# Patient Record
Sex: FEMALE | Race: WHITE | NOT HISPANIC OR LATINO | Employment: FULL TIME | URBAN - METROPOLITAN AREA
[De-identification: names, ages, dates, MRNs, and addresses within clinical notes are randomized per-mention and may not be internally consistent; named-entity substitution may affect disease eponyms.]

---

## 2022-08-03 PROBLEM — R00.2 PALPITATIONS: Status: ACTIVE | Noted: 2022-08-03

## 2022-08-03 NOTE — PROGRESS NOTES
Date of Office Visit: 2022  Encounter Provider: Dr. Narayan Siddiqui  Place of Service: [facility] CARDIOLOGY [place]  Patient Name: Bell Rodriguez  :1980  Provider, No Known    Chief Complaint   Patient presents with   • Palpitations   • Consult     History of Present Illness:    I am pleased to see Mrs. Rodriguez in my office today as a new consultation.    As you know, patient is 42 years old white female whose past medical history is insignificant for any medical illness who is referred to me for symptom of palpitation.    Patient reports that she has history of significant obesity few years back.  Patient underwent gastric sleeve operation.  She used to weigh 260 at that time.  After that patient has lost more than 100 pounds.  In 2022, patient had COVID-19 infection.  Since then she reports that she is having symptom of palpitation.  She described this as a skipping of the heartbeat.  Patient described no racing of the heart.  Patient complain of dizziness and lightheadedness with these episodes.  Patient also complain of fogginess.  Patient denies any chest pain.  No mone syncope.  No shortness of breath.    Patient does not have previous history of CAD, PCI or MI.  No previous history of asthma or COPD.  She does not smoke or abuse alcohol.    EKG showed normal sinus rhythm.  Poor progression of R wave noted.    At this stage, I would recommend to proceed with event monitor.  I would proceed with echocardiogram and stress test.        Past Medical History:   Diagnosis Date   • Diabetes mellitus (HCC)    • Palpitations          Past Surgical History:   Procedure Laterality Date   • CARDIAC CATHETERIZATION     •  SECTION     • GASTRIC SLEEVE LAPAROSCOPIC     • HYSTERECTOMY           No current outpatient medications on file.      Social History     Socioeconomic History   • Marital status:    Tobacco Use   • Smoking status: Former Smoker     Quit date: 2012     Years since  "quitting: 10.5   • Smokeless tobacco: Never Used   Vaping Use   • Vaping Use: Some days   • Substances: Nicotine   • Passive vaping exposure: Yes   Substance and Sexual Activity   • Alcohol use: Yes     Comment: rarely   • Drug use: Yes     Types: Marijuana   • Sexual activity: Defer         Review of Systems   Constitutional: Negative for chills and fever.   HENT: Negative for ear discharge and nosebleeds.    Eyes: Negative for discharge and redness.   Cardiovascular: Positive for palpitations. Negative for chest pain, orthopnea, paroxysmal nocturnal dyspnea and syncope.   Respiratory: Positive for shortness of breath. Negative for cough and wheezing.    Endocrine: Negative for heat intolerance.   Skin: Negative for rash.   Musculoskeletal: Negative for arthritis and myalgias.   Gastrointestinal: Negative for abdominal pain, melena, nausea and vomiting.   Genitourinary: Negative for dysuria and hematuria.   Neurological: Positive for dizziness. Negative for light-headedness, numbness and tremors.   Psychiatric/Behavioral: Negative for depression. The patient is not nervous/anxious.        Procedures      ECG 12 Lead    Date/Time: 8/4/2022 6:07 PM  Performed by: Narayan Siddiqui MD  Authorized by: Narayan Siddiqui MD   Comparison: compared with previous ECG   Similar to previous ECG  Rhythm: sinus rhythm    Clinical impression: normal ECG            ECG 12 Lead    (Results Pending)           Objective:    /78 (BP Location: Left arm, Patient Position: Sitting, Cuff Size: Adult)   Pulse 61   Ht 162.6 cm (64\")   Wt 63 kg (139 lb)   SpO2 99%   BMI 23.86 kg/m²         Constitutional:       Appearance: Well-developed.   Eyes:      General: No scleral icterus.        Right eye: No discharge.   HENT:      Head: Normocephalic and atraumatic.   Neck:      Thyroid: No thyromegaly.      Lymphadenopathy: No cervical adenopathy.   Pulmonary:      Effort: Pulmonary effort is normal. No respiratory distress.      Breath " sounds: Normal breath sounds. No wheezing. No rales.   Cardiovascular:      Normal rate. Regular rhythm.      No gallop.   Abdominal:      Tenderness: There is no abdominal tenderness.   Skin:     Findings: No erythema or rash.   Neurological:      Mental Status: Alert and oriented to person, place, and time.             Assessment:       Diagnosis Plan   1. Palpitations  ECG 12 Lead    Cardiac Event Monitor    Stress Test With Myocardial Perfusion One Day    Adult Transthoracic Echo Complete W/ Cont if Necessary Per Protocol   2. Dizziness  ECG 12 Lead    Cardiac Event Monitor    Stress Test With Myocardial Perfusion One Day    Adult Transthoracic Echo Complete W/ Cont if Necessary Per Protocol            Plan:       MDM:    1.  Palpitation:    I would proceed with event monitor.  Echocardiogram would be done.  Stress test should be done    2.  Dizziness:    I will proceed with echocardiogram

## 2022-08-04 ENCOUNTER — OFFICE VISIT (OUTPATIENT)
Dept: CARDIOLOGY | Facility: CLINIC | Age: 42
End: 2022-08-04

## 2022-08-04 VITALS
SYSTOLIC BLOOD PRESSURE: 130 MMHG | HEART RATE: 61 BPM | HEIGHT: 64 IN | WEIGHT: 139 LBS | OXYGEN SATURATION: 99 % | BODY MASS INDEX: 23.73 KG/M2 | DIASTOLIC BLOOD PRESSURE: 78 MMHG

## 2022-08-04 DIAGNOSIS — R00.2 PALPITATIONS: Primary | ICD-10-CM

## 2022-08-04 DIAGNOSIS — R42 DIZZINESS: ICD-10-CM

## 2022-08-04 PROBLEM — E55.9 VITAMIN D DEFICIENCY: Status: ACTIVE | Noted: 2022-08-04

## 2022-08-04 PROBLEM — G47.00 INSOMNIA: Status: ACTIVE | Noted: 2022-08-04

## 2022-08-04 PROCEDURE — 99204 OFFICE O/P NEW MOD 45 MIN: CPT | Performed by: INTERNAL MEDICINE

## 2022-08-04 PROCEDURE — 93000 ELECTROCARDIOGRAM COMPLETE: CPT | Performed by: INTERNAL MEDICINE

## 2022-08-04 RX ORDER — CHOLECALCIFEROL (VITAMIN D3) 125 MCG
CAPSULE ORAL DAILY
COMMUNITY
End: 2022-08-04

## 2022-08-04 RX ORDER — DIPHENOXYLATE HYDROCHLORIDE AND ATROPINE SULFATE 2.5; .025 MG/1; MG/1
TABLET ORAL DAILY
COMMUNITY
End: 2022-08-04

## 2022-08-04 RX ORDER — DOXYCYCLINE HYCLATE 50 MG/1
324 CAPSULE, GELATIN COATED ORAL DAILY
COMMUNITY
End: 2022-08-04

## 2022-09-02 ENCOUNTER — HOSPITAL ENCOUNTER (OUTPATIENT)
Dept: NUCLEAR MEDICINE | Facility: HOSPITAL | Age: 42
Discharge: HOME | End: 2022-09-02

## 2022-09-02 ENCOUNTER — HOSPITAL ENCOUNTER (OUTPATIENT)
Dept: CARDIOLOGY | Facility: HOSPITAL | Age: 42
Discharge: HOME | End: 2022-09-02

## 2022-09-02 ENCOUNTER — HOSPITAL ENCOUNTER (OUTPATIENT)
Dept: RESPIRATORY THERAPY | Facility: HOSPITAL | Age: 42
Discharge: HOME | End: 2022-09-02

## 2022-09-02 DIAGNOSIS — R42 DIZZINESS: ICD-10-CM

## 2022-09-02 DIAGNOSIS — R00.2 PALPITATIONS: ICD-10-CM

## 2022-09-02 PROCEDURE — 93017 CV STRESS TEST TRACING ONLY: CPT

## 2022-09-02 PROCEDURE — 0 TECHNETIUM TETROFOSMIN KIT: Performed by: INTERNAL MEDICINE

## 2022-09-02 PROCEDURE — 78452 HT MUSCLE IMAGE SPECT MULT: CPT | Performed by: INTERNAL MEDICINE

## 2022-09-02 PROCEDURE — 93306 TTE W/DOPPLER COMPLETE: CPT | Performed by: INTERNAL MEDICINE

## 2022-09-02 PROCEDURE — 93018 CV STRESS TEST I&R ONLY: CPT | Performed by: INTERNAL MEDICINE

## 2022-09-02 PROCEDURE — 93306 TTE W/DOPPLER COMPLETE: CPT

## 2022-09-02 PROCEDURE — A9502 TC99M TETROFOSMIN: HCPCS | Performed by: INTERNAL MEDICINE

## 2022-09-02 PROCEDURE — 93270 REMOTE 30 DAY ECG REV/REPORT: CPT

## 2022-09-02 PROCEDURE — 78452 HT MUSCLE IMAGE SPECT MULT: CPT

## 2022-09-02 PROCEDURE — 93016 CV STRESS TEST SUPVJ ONLY: CPT | Performed by: INTERNAL MEDICINE

## 2022-09-02 RX ADMIN — TETROFOSMIN 1 DOSE: 1.38 INJECTION, POWDER, LYOPHILIZED, FOR SOLUTION INTRAVENOUS at 11:57

## 2022-09-02 RX ADMIN — TETROFOSMIN 1 DOSE: 1.38 INJECTION, POWDER, LYOPHILIZED, FOR SOLUTION INTRAVENOUS at 10:15

## 2022-09-05 LAB
BH CV NUCLEAR PRIOR STUDY: 3
BH CV REST NUCLEAR ISOTOPE DOSE: 11 MCI
BH CV STRESS BP STAGE 1: NORMAL
BH CV STRESS BP STAGE 2: NORMAL
BH CV STRESS BP STAGE 3: NORMAL
BH CV STRESS DURATION MIN STAGE 1: 3
BH CV STRESS DURATION MIN STAGE 2: 3
BH CV STRESS DURATION MIN STAGE 3: 3
BH CV STRESS DURATION MIN STAGE 4: 3
BH CV STRESS DURATION SEC STAGE 1: 0
BH CV STRESS DURATION SEC STAGE 2: 0
BH CV STRESS DURATION SEC STAGE 3: 0
BH CV STRESS DURATION SEC STAGE 4: 0
BH CV STRESS GRADE STAGE 1: 10
BH CV STRESS GRADE STAGE 2: 12
BH CV STRESS GRADE STAGE 3: 14
BH CV STRESS GRADE STAGE 4: 16
BH CV STRESS HR STAGE 1: 86
BH CV STRESS HR STAGE 2: 93
BH CV STRESS HR STAGE 3: 111
BH CV STRESS HR STAGE 4: 152
BH CV STRESS METS STAGE 1: 5
BH CV STRESS METS STAGE 2: 7.5
BH CV STRESS METS STAGE 3: 10
BH CV STRESS METS STAGE 4: 13.5
BH CV STRESS NUCLEAR ISOTOPE DOSE: 32 MCI
BH CV STRESS PROTOCOL 1: NORMAL
BH CV STRESS RECOVERY BP: NORMAL MMHG
BH CV STRESS RECOVERY HR: 71 BPM
BH CV STRESS SPEED STAGE 1: 1.7
BH CV STRESS SPEED STAGE 2: 2.5
BH CV STRESS SPEED STAGE 3: 3.4
BH CV STRESS SPEED STAGE 4: 4.2
BH CV STRESS STAGE 1: 1
BH CV STRESS STAGE 2: 2
BH CV STRESS STAGE 3: 3
BH CV STRESS STAGE 4: 4
LV EF NUC BP: 72 %
MAXIMAL PREDICTED HEART RATE: 178 BPM
PERCENT MAX PREDICTED HR: 85.39 %
STRESS BASELINE BP: NORMAL MMHG
STRESS BASELINE HR: 56 BPM
STRESS PERCENT HR: 100 %
STRESS POST ESTIMATED WORKLOAD: 12.8 METS
STRESS POST EXERCISE DUR MIN: 12 MIN
STRESS POST EXERCISE DUR SEC: 21 SEC
STRESS POST PEAK BP: NORMAL MMHG
STRESS POST PEAK HR: 152 BPM
STRESS TARGET HR: 151 BPM

## 2022-09-06 ENCOUNTER — TELEPHONE (OUTPATIENT)
Dept: CARDIOLOGY | Facility: CLINIC | Age: 42
End: 2022-09-06

## 2022-09-06 LAB
BH CV ECHO MEAS - ACS: 1.79 CM
BH CV ECHO MEAS - AO MAX PG: 8.2 MMHG
BH CV ECHO MEAS - AO MEAN PG: 3.6 MMHG
BH CV ECHO MEAS - AO ROOT DIAM: 2.9 CM
BH CV ECHO MEAS - AO V2 MAX: 143.3 CM/SEC
BH CV ECHO MEAS - AO V2 VTI: 28 CM
BH CV ECHO MEAS - AVA(I,D): 1.88 CM2
BH CV ECHO MEAS - EDV(CUBED): 121.4 ML
BH CV ECHO MEAS - EDV(MOD-SP4): 75.8 ML
BH CV ECHO MEAS - EF(MOD-BP): 60 %
BH CV ECHO MEAS - EF(MOD-SP4): 59.8 %
BH CV ECHO MEAS - ESV(CUBED): 39.8 ML
BH CV ECHO MEAS - ESV(MOD-SP4): 30.4 ML
BH CV ECHO MEAS - FS: 31 %
BH CV ECHO MEAS - IVS/LVPW: 0.67 CM
BH CV ECHO MEAS - IVSD: 0.48 CM
BH CV ECHO MEAS - LA A2CS (ATRIAL LENGTH): 3 CM
BH CV ECHO MEAS - LV DIASTOLIC VOL/BSA (35-75): 45.2 CM2
BH CV ECHO MEAS - LV MASS(C)D: 92.8 GRAMS
BH CV ECHO MEAS - LV MAX PG: 5.2 MMHG
BH CV ECHO MEAS - LV MEAN PG: 2.6 MMHG
BH CV ECHO MEAS - LV SYSTOLIC VOL/BSA (12-30): 18.2 CM2
BH CV ECHO MEAS - LV V1 MAX: 113.5 CM/SEC
BH CV ECHO MEAS - LV V1 VTI: 24.6 CM
BH CV ECHO MEAS - LVIDD: 5 CM
BH CV ECHO MEAS - LVIDS: 3.4 CM
BH CV ECHO MEAS - LVOT AREA: 2.14 CM2
BH CV ECHO MEAS - LVOT DIAM: 1.65 CM
BH CV ECHO MEAS - LVPWD: 0.71 CM
BH CV ECHO MEAS - MV A MAX VEL: 61.4 CM/SEC
BH CV ECHO MEAS - MV DEC SLOPE: 464.4 CM/SEC2
BH CV ECHO MEAS - MV DEC TIME: 0.22 MSEC
BH CV ECHO MEAS - MV E MAX VEL: 104 CM/SEC
BH CV ECHO MEAS - MV E/A: 1.69
BH CV ECHO MEAS - MV MAX PG: 5.5 MMHG
BH CV ECHO MEAS - MV MEAN PG: 1.63 MMHG
BH CV ECHO MEAS - MV V2 VTI: 37.5 CM
BH CV ECHO MEAS - MVA(VTI): 1.41 CM2
BH CV ECHO MEAS - PA V2 MAX: 99.3 CM/SEC
BH CV ECHO MEAS - PULM A REVS DUR: 0.09 SEC
BH CV ECHO MEAS - PULM A REVS VEL: 31 CM/SEC
BH CV ECHO MEAS - PULM DIAS VEL: 45.1 CM/SEC
BH CV ECHO MEAS - PULM S/D: 1.26
BH CV ECHO MEAS - PULM SYS VEL: 56.6 CM/SEC
BH CV ECHO MEAS - QP/QS: 0.98
BH CV ECHO MEAS - RV MAX PG: 2.9 MMHG
BH CV ECHO MEAS - RV V1 MAX: 84.9 CM/SEC
BH CV ECHO MEAS - RV V1 VTI: 20.9 CM
BH CV ECHO MEAS - RVDD: 1.58 CM
BH CV ECHO MEAS - RVOT DIAM: 1.77 CM
BH CV ECHO MEAS - SI(MOD-SP4): 27.1 ML/M2
BH CV ECHO MEAS - SV(LVOT): 52.8 ML
BH CV ECHO MEAS - SV(MOD-SP4): 45.3 ML
BH CV ECHO MEAS - SV(RVOT): 51.6 ML
BH CV ECHO MEAS - TR MAX PG: 18.7 MMHG
BH CV ECHO MEAS - TR MAX VEL: 215 CM/SEC
MAXIMAL PREDICTED HEART RATE: 178 BPM
STRESS TARGET HR: 151 BPM

## 2022-09-06 NOTE — TELEPHONE ENCOUNTER
Stress and echo looked ok keep follow up appt to discuss in detail       Hub can release this information

## 2022-10-08 PROCEDURE — 93272 ECG/REVIEW INTERPRET ONLY: CPT | Performed by: INTERNAL MEDICINE

## 2022-10-11 NOTE — PROGRESS NOTES
Date of Office Visit: 10/12/2022  Encounter Provider: Dr.Syed Siddiqui  Place of Service: [facility] CARDIOLOGY [place]  Patient Name: Bell Rodriguez  :1980  Provider, No Known    Chief Complaint   Patient presents with   • Palpitations   • Follow-up     History of Present Illness    I am pleased to see Mrs. Rodriguez in my office today as a follow-up    As you know, patient is 42 years old white female whose past medical history is insignificant for any medical illness who came today for follow-up.    In 2022, patient was presented to me for symptom of palpitation.  In 2022, patient had COVID-19 infection and since then patient is reporting symptom of palpitation described as a skipping of the heartbeat.  Patient underwent event monitor which showed PVCs and PACs which constitute 1% of total beats analyzed.  Patient underwent stress test which was negative for ischemia or myocardial infarction.  Patient underwent echocardiogram which showed normal left ventricle size and function with EF of 55 to 60% and no significant valvular heart disease noted.    Patient does not have previous history of CAD, PCI or MI.  She had no abuse of alcohol or tobacco.  Patient has history of morbid obesity in the past and has lost considerable weight after gastric sleeve operation.    Since the previous visit, patient is still having isolated episode of palpitation.  Patient has not passed out.  Patient denies any syncope or presyncope.  No palpitation.  No chest pain.    Most likely cause of her palpitations is premature contraction.  Etiology is unclear to me but I think it is electrical problem.  Patient cardiac work-up has been unremarkable.  At this stage I would not start any medication.  Patient has relative bradycardia any beta-blocker or calcium channel blocker would worsen this problem.  I would recommend observation.  Patient is advised to call me if there is worsening of the symptoms.          Past  "Medical History:   Diagnosis Date   • Diabetes mellitus (HCC)    • Palpitations          Past Surgical History:   Procedure Laterality Date   • CARDIAC CATHETERIZATION     •  SECTION     • GASTRIC SLEEVE LAPAROSCOPIC     • HYSTERECTOMY           No current outpatient medications on file.      Social History     Socioeconomic History   • Marital status:    Tobacco Use   • Smoking status: Former     Types: Cigarettes     Quit date:      Years since quitting: 10.7   • Smokeless tobacco: Never   Vaping Use   • Vaping Use: Some days   • Substances: Nicotine   • Passive vaping exposure: Yes   Substance and Sexual Activity   • Alcohol use: Yes     Comment: rarely   • Drug use: Yes     Types: Marijuana   • Sexual activity: Defer         Review of Systems   Constitutional: Negative for chills and fever.   HENT: Negative for ear discharge and nosebleeds.    Eyes: Negative for discharge and redness.   Cardiovascular: Positive for palpitations. Negative for chest pain, orthopnea, paroxysmal nocturnal dyspnea and syncope.   Respiratory: Negative for cough, shortness of breath and wheezing.    Endocrine: Negative for heat intolerance.   Skin: Negative for rash.   Musculoskeletal: Negative for arthritis and myalgias.   Gastrointestinal: Negative for abdominal pain, melena, nausea and vomiting.   Genitourinary: Negative for dysuria and hematuria.   Neurological: Negative for dizziness, light-headedness, numbness and tremors.   Psychiatric/Behavioral: Negative for depression. The patient is not nervous/anxious.        Procedures    Procedures    No orders to display           Objective:    /74 (BP Location: Left arm, Patient Position: Sitting, Cuff Size: Adult)   Pulse 62   Ht 162.6 cm (64.02\")   Wt 60.8 kg (134 lb)   SpO2 98%   BMI 22.99 kg/m²         Constitutional:       Appearance: Well-developed.   Eyes:      General: No scleral icterus.        Right eye: No discharge.   HENT:      Head: " Normocephalic and atraumatic.   Neck:      Thyroid: No thyromegaly.      Lymphadenopathy: No cervical adenopathy.   Pulmonary:      Effort: Pulmonary effort is normal. No respiratory distress.      Breath sounds: Normal breath sounds. No wheezing. No rales.   Cardiovascular:      Normal rate. Regular rhythm.      No gallop.   Abdominal:      Tenderness: There is no abdominal tenderness.   Skin:     Findings: No erythema or rash.   Neurological:      Mental Status: Alert and oriented to person, place, and time.             Assessment:       Diagnosis Plan   1. Palpitations                 Plan:       Patient underwent extensive cardiac work-up and it has been negative.  Patient is noted to have PVCs and PACs.  At this stage I would recommend patient should proceed with observation.  Patient is explained about her condition.  Patient understood and agreed with the plan.  If there is worsening of symptoms I would consider calcium channel blocker.  Current treatment would be continued I will see the patient in 1 year

## 2022-10-12 ENCOUNTER — OFFICE VISIT (OUTPATIENT)
Dept: CARDIOLOGY | Facility: CLINIC | Age: 42
End: 2022-10-12

## 2022-10-12 VITALS
WEIGHT: 134 LBS | DIASTOLIC BLOOD PRESSURE: 74 MMHG | OXYGEN SATURATION: 98 % | SYSTOLIC BLOOD PRESSURE: 128 MMHG | HEART RATE: 62 BPM | BODY MASS INDEX: 22.88 KG/M2 | HEIGHT: 64 IN

## 2022-10-12 DIAGNOSIS — R00.2 PALPITATIONS: Primary | ICD-10-CM

## 2022-10-12 PROCEDURE — 99212 OFFICE O/P EST SF 10 MIN: CPT | Performed by: INTERNAL MEDICINE

## 2023-02-20 ENCOUNTER — APPOINTMENT (OUTPATIENT)
Dept: GENERAL RADIOLOGY | Facility: HOSPITAL | Age: 43
End: 2023-02-20
Payer: COMMERCIAL

## 2023-02-20 ENCOUNTER — HOSPITAL ENCOUNTER (EMERGENCY)
Facility: HOSPITAL | Age: 43
Discharge: HOME | End: 2023-02-20
Attending: EMERGENCY MEDICINE | Admitting: EMERGENCY MEDICINE
Payer: COMMERCIAL

## 2023-02-20 ENCOUNTER — TELEPHONE (OUTPATIENT)
Dept: CARDIOLOGY | Facility: CLINIC | Age: 43
End: 2023-02-20

## 2023-02-20 VITALS
HEIGHT: 65 IN | RESPIRATION RATE: 18 BRPM | OXYGEN SATURATION: 96 % | WEIGHT: 136.02 LBS | HEART RATE: 63 BPM | DIASTOLIC BLOOD PRESSURE: 81 MMHG | TEMPERATURE: 97.8 F | BODY MASS INDEX: 22.66 KG/M2 | SYSTOLIC BLOOD PRESSURE: 112 MMHG

## 2023-02-20 DIAGNOSIS — R00.2 PALPITATION: Primary | ICD-10-CM

## 2023-02-20 DIAGNOSIS — Z20.822 COVID-19 RULED OUT BY LABORATORY TESTING: ICD-10-CM

## 2023-02-20 LAB
ALBUMIN SERPL-MCNC: 4.4 G/DL (ref 3.5–5.2)
ALBUMIN/GLOB SERPL: 1.5 G/DL
ALP SERPL-CCNC: 123 U/L (ref 39–117)
ALT SERPL W P-5'-P-CCNC: 54 U/L (ref 1–33)
ANION GAP SERPL CALCULATED.3IONS-SCNC: 9 MMOL/L (ref 5–15)
AST SERPL-CCNC: 36 U/L (ref 1–32)
BASOPHILS # BLD AUTO: 0.1 10*3/MM3 (ref 0–0.2)
BASOPHILS NFR BLD AUTO: 0.9 % (ref 0–1.5)
BILIRUB SERPL-MCNC: 0.6 MG/DL (ref 0–1.2)
BUN SERPL-MCNC: 13 MG/DL (ref 6–20)
BUN/CREAT SERPL: 21.7 (ref 7–25)
CALCIUM SPEC-SCNC: 9.9 MG/DL (ref 8.6–10.5)
CHLORIDE SERPL-SCNC: 104 MMOL/L (ref 98–107)
CO2 SERPL-SCNC: 29 MMOL/L (ref 22–29)
CREAT SERPL-MCNC: 0.6 MG/DL (ref 0.57–1)
DEPRECATED RDW RBC AUTO: 47.3 FL (ref 37–54)
EGFRCR SERPLBLD CKD-EPI 2021: 114.4 ML/MIN/1.73
EOSINOPHIL # BLD AUTO: 0.1 10*3/MM3 (ref 0–0.4)
EOSINOPHIL NFR BLD AUTO: 2 % (ref 0.3–6.2)
ERYTHROCYTE [DISTWIDTH] IN BLOOD BY AUTOMATED COUNT: 13.9 % (ref 12.3–15.4)
FLUAV SUBTYP SPEC NAA+PROBE: NOT DETECTED
FLUBV RNA ISLT QL NAA+PROBE: NOT DETECTED
GLOBULIN UR ELPH-MCNC: 2.9 GM/DL
GLUCOSE SERPL-MCNC: 95 MG/DL (ref 65–99)
HCT VFR BLD AUTO: 41.4 % (ref 34–46.6)
HGB BLD-MCNC: 13.6 G/DL (ref 12–15.9)
LYMPHOCYTES # BLD AUTO: 1.6 10*3/MM3 (ref 0.7–3.1)
LYMPHOCYTES NFR BLD AUTO: 22.5 % (ref 19.6–45.3)
MCH RBC QN AUTO: 30.1 PG (ref 26.6–33)
MCHC RBC AUTO-ENTMCNC: 32.8 G/DL (ref 31.5–35.7)
MCV RBC AUTO: 91.7 FL (ref 79–97)
MONOCYTES # BLD AUTO: 0.5 10*3/MM3 (ref 0.1–0.9)
MONOCYTES NFR BLD AUTO: 7.3 % (ref 5–12)
NEUTROPHILS NFR BLD AUTO: 4.8 10*3/MM3 (ref 1.7–7)
NEUTROPHILS NFR BLD AUTO: 67.3 % (ref 42.7–76)
NRBC BLD AUTO-RTO: 0.1 /100 WBC (ref 0–0.2)
NT-PROBNP SERPL-MCNC: 98.1 PG/ML (ref 0–450)
PLATELET # BLD AUTO: 164 10*3/MM3 (ref 140–450)
PMV BLD AUTO: 10.5 FL (ref 6–12)
POTASSIUM SERPL-SCNC: 4.3 MMOL/L (ref 3.5–5.2)
PROT SERPL-MCNC: 7.3 G/DL (ref 6–8.5)
QT INTERVAL: 401 MS
RBC # BLD AUTO: 4.51 10*6/MM3 (ref 3.77–5.28)
SARS-COV-2 RNA RESP QL NAA+PROBE: NOT DETECTED
SODIUM SERPL-SCNC: 142 MMOL/L (ref 136–145)
TROPONIN T SERPL HS-MCNC: <6 NG/L
WBC NRBC COR # BLD: 7.1 10*3/MM3 (ref 3.4–10.8)

## 2023-02-20 PROCEDURE — 99284 EMERGENCY DEPT VISIT MOD MDM: CPT

## 2023-02-20 PROCEDURE — 93005 ELECTROCARDIOGRAM TRACING: CPT | Performed by: NURSE PRACTITIONER

## 2023-02-20 PROCEDURE — 71045 X-RAY EXAM CHEST 1 VIEW: CPT

## 2023-02-20 PROCEDURE — 80053 COMPREHEN METABOLIC PANEL: CPT | Performed by: NURSE PRACTITIONER

## 2023-02-20 PROCEDURE — 85025 COMPLETE CBC W/AUTO DIFF WBC: CPT | Performed by: NURSE PRACTITIONER

## 2023-02-20 PROCEDURE — 84484 ASSAY OF TROPONIN QUANT: CPT | Performed by: NURSE PRACTITIONER

## 2023-02-20 PROCEDURE — 87636 SARSCOV2 & INF A&B AMP PRB: CPT | Performed by: NURSE PRACTITIONER

## 2023-02-20 PROCEDURE — 93005 ELECTROCARDIOGRAM TRACING: CPT

## 2023-02-20 PROCEDURE — 83880 ASSAY OF NATRIURETIC PEPTIDE: CPT | Performed by: NURSE PRACTITIONER

## 2023-02-20 RX ORDER — SODIUM CHLORIDE 0.9 % (FLUSH) 0.9 %
10 SYRINGE (ML) INJECTION AS NEEDED
Status: DISCONTINUED | OUTPATIENT
Start: 2023-02-20 | End: 2023-02-20 | Stop reason: HOSPADM

## 2023-02-20 NOTE — DISCHARGE INSTRUCTIONS
See Dr. Siddiqui as needed for follow-up or see Dr. Caldwell the electrophysiologist    Return for any pain or worsening symptoms

## 2023-02-20 NOTE — ED PROVIDER NOTES
"Subjective   History of Present Illness  Patient is a 43-year-old white female, with a PMHx of PVC's, PAC's, and MR- as well as gastric sleev procedure in 2016- who presents to the ED today with complaints of palpitations that began intermittently since she had COVID last year as well as symptoms suggesting the presence of an URI that began last Thursday.  Patient admits to cough and congestion with production of green sputum.  She denies body aches and fever/chils.  She admits to taking Mucinex which has provided some relief and claims that some of her symptoms are now resolving.  She describes the congestion as heavy in her chest which soon ascended to her sinuses.  She describes her cough and congestion severity is a 3 out of 10.  She admits that her cough and sputum production is mostly persistent.  Patient admits that her palpitations are a chronic problem which today became severe enough for her to seek emergency care.  She admits to taking deep breaths to alleviate the palpitations however sometimes this exacerbates her discomfort.  She describes the quality of her palpitations as \" taking her breath away\" and as if her \" heart is quivering or producing extra beats\".  She describes the severity of her palpitations today as a 7 out of 10.  She admits that the palpitations are very intermittent and unpredictable.  She admits to shortness of air during the episodes of palpitations.  She denies nausea, vomiting, syncope, and/or chest pain.  She denies any recent sick contacts.  She admits to seeing Dr. Orr her cardiologist which detected PVCs and PACs on most recent Holter monitor that she wore for 30 days.        Review of Systems   Constitutional: Positive for fatigue. Negative for chills and fever.   HENT: Positive for congestion and sinus pressure. Negative for tinnitus and trouble swallowing.    Eyes: Negative for photophobia, discharge and redness.   Respiratory: Positive for cough and shortness of " breath.    Cardiovascular: Positive for palpitations. Negative for chest pain.   Gastrointestinal: Negative for abdominal pain, diarrhea, nausea and vomiting.   Genitourinary: Negative for dysuria, frequency and urgency.   Musculoskeletal: Negative for back pain, joint swelling and myalgias.   Skin: Negative for rash.   Neurological: Negative for dizziness and headaches.   Psychiatric/Behavioral: Negative for confusion.   All other systems reviewed and are negative.      Past Medical History:   Diagnosis Date   • Diabetes mellitus (HCC)    • Palpitations        Allergies   Allergen Reactions   • Ketoprofen Rash   • L-Methylfolate-B6-B12 Rash       Past Surgical History:   Procedure Laterality Date   • CARDIAC CATHETERIZATION     •  SECTION     • GASTRIC SLEEVE LAPAROSCOPIC     • HYSTERECTOMY         History reviewed. No pertinent family history.    Social History     Socioeconomic History   • Marital status:    Tobacco Use   • Smoking status: Former     Types: Cigarettes     Quit date:      Years since quittin.1   • Smokeless tobacco: Never   Vaping Use   • Vaping Use: Some days   • Substances: Nicotine   • Passive vaping exposure: Yes   Substance and Sexual Activity   • Alcohol use: Yes     Comment: rarely   • Drug use: Yes     Types: Marijuana   • Sexual activity: Defer           Objective   Physical Exam    Procedures         EKG was interpreted by myself as sinus bradycardia with a rate of 51 it was compared to previous dated 2023 which is also sinus bradycardia at 59 they were read by Dr. Cottrell  ED Course  ED Course as of 23   1208 Spoke to the patient who was told of the negative test results today and states that she is comfortable going home she states that she had an appointment with Dr. Siddiqui at 4:00 this afternoon which she canceled but she states she will call and see if she can get back into them she also states that they had spoken to her  "about seeing the electrophysiologist I will put 1 on her discharge instructions for follow-up as well she is not having any pain or symptoms at the time of discharge [KW]      ED Course User Index  [KW] Angeline Paiz, APRN      /81   Pulse 63   Temp 97.8 °F (36.6 °C) (Oral)   Resp 18   Ht 165.1 cm (65\")   Wt 61.7 kg (136 lb 0.4 oz)   SpO2 96%   BMI 22.64 kg/m²   Labs Reviewed   COMPREHENSIVE METABOLIC PANEL - Abnormal; Notable for the following components:       Result Value    ALT (SGPT) 54 (*)     AST (SGOT) 36 (*)     Alkaline Phosphatase 123 (*)     All other components within normal limits    Narrative:     GFR Normal >60  Chronic Kidney Disease <60  Kidney Failure <15     COVID-19 AND FLU A/B, NP SWAB IN TRANSPORT MEDIA 8-12 HR TAT - Normal    Narrative:     Fact sheet for providers: https://www.fda.gov/media/134351/download    Fact sheet for patients: https://www.fda.gov/media/888314/download    Test performed by PCR.   BNP (IN-HOUSE) - Normal    Narrative:     Among patients with dyspnea, NT-proBNP is highly sensitive for the detection of acute congestive heart failure. In addition NT-proBNP of <300 pg/ml effectively rules out acute congestive heart failure with 99% negative predictive value.     TROPONIN - Normal    Narrative:     High Sensitive Troponin T Reference Range:  <10.0 ng/L- Negative Female for AMI  <15.0 ng/L- Negative Male for AMI  >=10 - Abnormal Female indicating possible myocardial injury.  >=15 - Abnormal Male indicating possible myocardial injury.   Clinicians would have to utilize clinical acumen, EKG, Troponin, and serial changes to determine if it is an Acute Myocardial Infarction or myocardial injury due to an underlying chronic condition.        CBC WITH AUTO DIFFERENTIAL - Normal   CBC AND DIFFERENTIAL    Narrative:     The following orders were created for panel order CBC & Differential.  Procedure                               Abnormality         Status          "            ---------                               -----------         ------                     CBC Auto Differential[[id number]]        Normal              Final result                 Please view results for these tests on the individual orders.     Medications - No data to display  XR Chest 1 View    Result Date: 2/20/2023  Impression: Normal chest. Electronically Signed: Jessica Sanchezsky  2/20/2023 12:17 PM EST  Workstation ID: [id number]                                         Medical Decision Making  Chart Review:  Artur   Stress Test  8/4/2022  Interpretation Summary    ? Diaphragmatic attenuation artifact is present.  ? Left ventricular ejection fraction is hyperdynamic (Calculated EF > 70%). .  ? Myocardial perfusion imaging indicates a normal myocardial perfusion study with no evidence of ischemia.  ? Impressions are consistent with a low risk study.  ? This is normal Cardiolite imaging stress test with no evidence of ischemia or myocardial infarction. Left ventricle size and function is normal on gated SPECT imaging. No wall motion abnormality was noted. Clinical correlation recommended. Further recommendation as per ordering physician..  ? Findings consistent with a normal ECG stress test.      8/4/2022  ECHO  Interpretation Summary    Left ventricular systolic function is normal.   Left ventricular ejection fraction is 55 to 60%   Left ventricular diastolic function was normal.     Patient is a 43-year-old female who came in after having acute palpitations today.  She had a CBC and chemistry and troponin which were interpreted as within normal limits the patient had no chest pain while she was in the emergency room she had no shortness of breath I reviewed her notes from her previous visit where she wore a M cot Holter monitor for 30 days in September we talked about placing another one but she does not wish to wear another when she found it cumbersome and not helpful she states that she will follow-up with  her cardiologist but they suggest that she also see electrophysiologist which I will refer her to on discharge we talked about the need for possible admission but she states she is asymptomatic at this time feels better and I feel with her prior history and being comfortable with her following up she will be discharged home she was agreeable this plan of care she was asymptomatic at    COVID-19 ruled out by laboratory testing: complicated acute illness or injury  Palpitation: complicated acute illness or injury  Amount and/or Complexity of Data Reviewed  External Data Reviewed: notes.     Details: From her Holter monitor and stress test previously which were essentially normal  Labs: ordered. Decision-making details documented in ED Course.  Radiology: ordered and independent interpretation performed. Decision-making details documented in ED Course.  ECG/medicine tests: ordered and independent interpretation performed. Decision-making details documented in ED Course.      Risk  OTC drugs.  Decision regarding hospitalization.          Final diagnoses:   Palpitation   COVID-19 ruled out by laboratory testing       ED Disposition  ED Disposition     ED Disposition   Discharge    Condition   Stable    Comment   --             Narayan Siddiqui MD  [facility] [facility] [place] [address]  [phone]      call for follow up    Fransico Caldwell MD  [address] [facility] [place] [address]  [phone]      Call for further evaluation of palpitations    PATIENT CONNECTION - [place] [address]  [phone]  Follow up           Medication List      No changes were made to your prescriptions during this visit.          Angeline Paiz, APRN  02/20/23 2109

## 2023-02-20 NOTE — CASE MANAGEMENT/SOCIAL WORK
Discharge Planning Assessment  [facility]     Patient Name: Bell Rodriguez  MRN: [id number]  Today's Date: 2/20/2023    Admit Date: 2/20/2023    Plan: Home   Discharge Needs Assessment     Row Name 02/20/23 1239       Living Environment    People in Home significant other    Current Living Arrangements home    Primary Care Provided by self    Provides Primary Care For no one    Family Caregiver if Needed significant other    Quality of Family Relationships supportive    Able to Return to Prior Arrangements yes       Resource/Environmental Concerns    Resource/Environmental Concerns none    Transportation Concerns none       Transition Planning    Patient/Family Anticipates Transition to home with family    Patient/Family Anticipated Services at Transition none    Transportation Anticipated car, drives self       Discharge Needs Assessment    Equipment Currently Used at Home none    Concerns to be Addressed denies needs/concerns at this time    Anticipated Changes Related to Illness none    Equipment Needed After Discharge none               Discharge Plan     Row Name 02/20/23 6739       Plan    Plan Comments Spoke with patient prior to dc.She states she is independent with ADLs and denies dc needs.She informs  and this was placed in EPIC.She confirms she doesn't have a pcp, nor does she want  to attempt to set her up with one. Phone number for patient connection hub placed on patient's AVS. Pt intends to return home w/ s/o and denies dc needs.    Row Name 02/20/23 1238       Plan    Plan Home              Continued Care and Services - Admitted Since 2/20/2023    Coordination has not been started for this encounter.          Demographic Summary     Row Name 02/20/23 1238       General Information    Admission Type other (see comments)  ER Visit    Arrived From home    Referral Source case finding  No PCP    Reason for Consult discharge planning    Preferred Language English        Contact Information    Permission Granted to Share Info With                Functional Status     Row Name 02/20/23 1238       Functional Status    Usual Activity Tolerance good    Current Activity Tolerance good       Functional Status, IADL    Medications independent    Meal Preparation independent    Housekeeping independent    Laundry independent    Shopping independent              Kalpana Sesay RN, [facility]  Office: [phone]  Fax: [phone]  [email or web address]      I met with patient in room wearing PPE: mask and goggles.     Maintained distance greater than six feet and spent </=15 minutes in the room          Kalpana Sesay RN

## 2023-02-20 NOTE — TELEPHONE ENCOUNTER
Caller: Bell Rodriguez    Relationship: Self    Best call back number: [phone]  What is the best time to reach you: ANY      What was the call regarding:PT DISCHARGED FROM ED TODAY 2.20.23. RECOMMENDED FOLLOW UP WITH DR. BEY AND DR. BARROS. SCED PT WITH MICHELE ON 3.13.23.     WILL BE NEW PT WITH DR. BARROS, WILL NEED REFERRAL FROM DR. BEY    Do you require a callback: YES

## 2023-02-21 LAB — QT INTERVAL: 419 MS

## 2023-03-13 ENCOUNTER — OFFICE VISIT (OUTPATIENT)
Dept: CARDIOLOGY | Facility: CLINIC | Age: 43
End: 2023-03-13
Payer: COMMERCIAL

## 2023-03-13 VITALS
OXYGEN SATURATION: 100 % | SYSTOLIC BLOOD PRESSURE: 112 MMHG | HEIGHT: 65 IN | HEART RATE: 70 BPM | DIASTOLIC BLOOD PRESSURE: 69 MMHG | WEIGHT: 136 LBS | BODY MASS INDEX: 22.66 KG/M2

## 2023-03-13 DIAGNOSIS — I49.3 PVC (PREMATURE VENTRICULAR CONTRACTION): Primary | ICD-10-CM

## 2023-03-13 DIAGNOSIS — R79.89 ELEVATED LFTS: ICD-10-CM

## 2023-03-13 DIAGNOSIS — R00.2 PALPITATIONS: ICD-10-CM

## 2023-03-13 PROCEDURE — 99214 OFFICE O/P EST MOD 30 MIN: CPT | Performed by: NURSE PRACTITIONER

## 2023-03-13 PROCEDURE — 93000 ELECTROCARDIOGRAM COMPLETE: CPT | Performed by: NURSE PRACTITIONER

## 2023-03-14 PROBLEM — R79.89 ELEVATED LFTS: Status: ACTIVE | Noted: 2023-03-14

## 2023-03-14 PROBLEM — I49.3 PVC (PREMATURE VENTRICULAR CONTRACTION): Status: ACTIVE | Noted: 2023-03-14

## 2023-04-04 ENCOUNTER — OFFICE VISIT (OUTPATIENT)
Dept: CARDIOLOGY | Facility: CLINIC | Age: 43
End: 2023-04-04
Payer: COMMERCIAL

## 2023-04-04 VITALS
DIASTOLIC BLOOD PRESSURE: 73 MMHG | HEART RATE: 75 BPM | OXYGEN SATURATION: 95 % | SYSTOLIC BLOOD PRESSURE: 106 MMHG | BODY MASS INDEX: 22.66 KG/M2 | HEIGHT: 65 IN | WEIGHT: 136 LBS

## 2023-04-04 DIAGNOSIS — R00.2 PALPITATIONS: Primary | ICD-10-CM

## 2023-04-04 DIAGNOSIS — I49.3 PVC (PREMATURE VENTRICULAR CONTRACTION): ICD-10-CM

## 2023-04-04 PROCEDURE — 99213 OFFICE O/P EST LOW 20 MIN: CPT | Performed by: INTERNAL MEDICINE

## 2023-04-04 RX ORDER — DILTIAZEM HYDROCHLORIDE 60 MG/1
60 TABLET, FILM COATED ORAL 3 TIMES DAILY PRN
Qty: 30 TABLET | Refills: 1 | Status: SHIPPED | OUTPATIENT
Start: 2023-04-04

## 2023-04-04 RX ORDER — ASPIRIN 81 MG/1
81 TABLET, CHEWABLE ORAL AS NEEDED
COMMUNITY

## 2023-04-04 NOTE — PROGRESS NOTES
HP      Name: Bell Rodriguez ADMIT: (Not on file)   : 1980  PCP: Provider, No Known    MRN: [id number] LOS: 0 days   AGE/SEX: 43 y.o. female  ROOM: Room/bed info not found     Chief Complaint   Patient presents with   • Consult     PVC's   • Palpitations       Subjective        History of present illness  Bell Rodriguez is a 43-year-old female patient who is here today for evaluation of palpitations.  Patient does not have any prior history of coronary artery disease, she has been having palpitations for some time, got worse after her COVID infection.  She did wear a Holter monitor in 2022 which at that time did not record any significant arrhythmia.  Since then however her palpitations have gotten worse.    Past Medical History:   Diagnosis Date   • Abnormal ECG    • Diabetes mellitus    • Palpitations      Past Surgical History:   Procedure Laterality Date   • CARDIAC CATHETERIZATION     •  SECTION     • GASTRIC SLEEVE LAPAROSCOPIC     • HYSTERECTOMY       Family History   Problem Relation Age of Onset   • Heart disease Maternal Grandmother      Social History     Tobacco Use   • Smoking status: Former     Types: Cigarettes     Quit date: 2012     Years since quittin.2   • Smokeless tobacco: Never   Vaping Use   • Vaping Use: Some days   • Substances: Nicotine   • Passive vaping exposure: Yes   Substance Use Topics   • Alcohol use: Yes     Comment: Might here and there maybe few times a year   • Drug use: Yes     Types: Marijuana     Comment: I smoke to eat to gain weight i had a gastric sleeve done .     (Not in a hospital admission)    Allergies:  Ketoprofen and L-methylfolate-b6-b12    Review of systems    Constitutional: Negative.    Respiratory and cardiovascular: As detailed in HPI section.  Gastrointestinal: Negative for constipation, nausea and vomiting negative for abdominal distention, abdominal pain and diarrhea.   Genitourinary: Negative for difficulty urinating and  flank pain.   Musculoskeletal: Negative for arthralgias, joint swelling and myalgias.   Skin: Negative for color change, rash and wound.   Neurological: Negative for dizziness, syncope, weakness and headaches.   Hematological: Negative for adenopathy.   Psychiatric/Behavioral: Negative for confusion.   All other systems reviewed and are negative.    Physical Exam  VITALS REVIEWED    General:      well developed, in no acute distress.    Head:      normocephalic and atraumatic.    Eyes:      PERRL/EOM intact, conjunctiva and sclera clear with out nystagmus.    Neck:      no masses, thyromegaly,  trachea central with normal respiratory effort and PMI displaced laterally  Lungs:      Clear to auscultation bilaterally  Heart:       Regular rate and rhythm  Msk:      no deformity or scoliosis noted of thoracic or lumbar spine.    Pulses:      pulses normal in all 4 extremities.    Extremities:       No lower extremity edema  Neurologic:      no focal deficits.   alert oriented x3  Skin:      intact without lesions or rashes.    Psych:      alert and cooperative; normal mood and affect; normal attention span and concentration.      Result Review :               Pertinent cardiac workup    1. Event monitor for 27 days on 9/2/2022 showed 1% PVCs and PACs, no arrhythmias.  2. Stress test 9/2/2022 low risk EF 70%  3. Echo 9/2/2022 ejection fraction 55 to 60%      Procedures        Assessment and Plan      Bell Rodriguez is a 43-year-old female patient who is here today for evaluation of palpitations.  I reviewed multiple EKGs in the chart, as well as the rhythm strips from her event monitor, unfortunately I did not see any PVCs on the twelve-lead and the PVC burden on the monitor was low.  Patient however says that her palpitations have gotten worse since then.  I would like to repeat monitor, we will order a 2-week Holter.  I will also give her a prescription for Cardizem short-acting to take on as-needed basis, but I advised  her to take dose after completion of the monitor to see if we recorded any PVCs.  If she has high burden PVCs and Cardizem does not help, then we can certainly discuss about ablation.  I will see her in a month.    Diagnoses and all orders for this visit:    1. Palpitations (Primary)  -     Holter Monitor - 72 Hour Up To 15 Days; Future    2. PVC (premature ventricular contraction)    Other orders  -     dilTIAZem (CARDIZEM) 60 MG tablet; Take 1 tablet by mouth 3 (Three) Times a Day As Needed (palpitations).  Dispense: 30 tablet; Refill: 1           Return in about 4 weeks (around 5/2/2023).  Patient was given instructions and counseling regarding her condition or for health maintenance advice. Please see specific information pulled into the AVS if appropriate.

## 2023-04-05 ENCOUNTER — PATIENT ROUNDING (BHMG ONLY) (OUTPATIENT)
Dept: CARDIOLOGY | Facility: CLINIC | Age: 43
End: 2023-04-05
Payer: COMMERCIAL

## 2023-04-05 NOTE — PROGRESS NOTES
A My-Chart message has been sent to the patient for PATIENT ROUNDING with [facility]

## 2023-05-03 ENCOUNTER — OFFICE VISIT (OUTPATIENT)
Dept: CARDIOLOGY | Facility: CLINIC | Age: 43
End: 2023-05-03
Payer: COMMERCIAL

## 2023-05-03 VITALS
SYSTOLIC BLOOD PRESSURE: 110 MMHG | OXYGEN SATURATION: 100 % | BODY MASS INDEX: 22.99 KG/M2 | WEIGHT: 138 LBS | HEART RATE: 57 BPM | HEIGHT: 65 IN | DIASTOLIC BLOOD PRESSURE: 69 MMHG

## 2023-05-03 DIAGNOSIS — I49.3 PVC (PREMATURE VENTRICULAR CONTRACTION): Primary | ICD-10-CM

## 2023-05-03 DIAGNOSIS — R00.2 PALPITATIONS: ICD-10-CM

## 2023-05-03 RX ORDER — DILTIAZEM HYDROCHLORIDE 120 MG/1
120 CAPSULE, COATED, EXTENDED RELEASE ORAL DAILY
Qty: 30 CAPSULE | Refills: 1 | Status: SHIPPED | OUTPATIENT
Start: 2023-05-03

## 2023-05-03 NOTE — PROGRESS NOTES
Progress note      Name: Bell Rodriguez ADMIT: (Not on file)   : 1980  PCP: Provider, No Known    MRN: [id number] LOS: 0 days   AGE/SEX: 43 y.o. female  ROOM: Room/bed info not found     Chief Complaint   Patient presents with   • Palpitations     4 week holter results       Subjective       History of present illness  Bell Rodriguez is a 43-year-old female patient no prior history of coronary artery disease, she was evaluated for palpitations.  During her previous visit I had started her on short acting Cardizem and a Holter monitor was ordered.  Patient says that the Cardizem helps but sometimes it drops her blood pressure.    Past Medical History:   Diagnosis Date   • Abnormal ECG    • Arrhythmia     Pvc amd pac   • Diabetes mellitus    • Palpitations      Past Surgical History:   Procedure Laterality Date   • CARDIAC CATHETERIZATION     •  SECTION     • GASTRIC SLEEVE LAPAROSCOPIC     • HYSTERECTOMY       Family History   Problem Relation Age of Onset   • Heart disease Maternal Grandmother      Social History     Tobacco Use   • Smoking status: Former     Types: Cigarettes     Quit date: 2012     Years since quittin.3   • Smokeless tobacco: Never   Vaping Use   • Vaping Use: Some days   • Substances: Nicotine   • Passive vaping exposure: Yes   Substance Use Topics   • Alcohol use: Yes     Comment: Might here and there maybe few times a year   • Drug use: Yes     Types: Marijuana     Comment: I smoke to eat to gain weight i had a gastric sleeve done .     (Not in a hospital admission)    Allergies:  Ketoprofen and L-methylfolate-b6-b12      Physical Exam  VITALS REVIEWED    General:      well developed, in no acute distress.    Head:      normocephalic and atraumatic.    Eyes:      PERRL/EOM intact, conjunctiva and sclera clear with out nystagmus.    Neck:      no masses, thyromegaly,  trachea central with normal respiratory effort and PMI displaced laterally  Lungs:      Clear to  auscultation bilaterally  Heart:       Regular rate and rhythm  Msk:      no deformity or scoliosis noted of thoracic or lumbar spine.    Pulses:      pulses normal in all 4 extremities.    Extremities:       No lower extremity edema  Neurologic:      no focal deficits.   alert oriented x3  Skin:      intact without lesions or rashes.    Psych:      alert and cooperative; normal mood and affect; normal attention span and concentration.      Result Review :               Pertinent cardiac workup    1. Event monitor for 27 days on 9/2/2022 showed 1% PVCs and PACs, no arrhythmias.  2. Stress test 9/2/2022 low risk EF 70%  3. Echo 9/2/2022 ejection fraction 55 to 60%  4. 14-day Holter monitor starting on 4/4/2023 sinus rhythm throughout, PVC burden less than 0.01%.      Procedures        Assessment and Plan      Bell Rodriguez is a 43-year-old female patient who has been complaining of palpitations.  Patient was started on short acting Cardizem which seems to help with her symptoms, 2 Holter monitors did not show any significant PVCs or other arrhythmias.  I will give her long-acting Cardizem since the short acting one is causing hypotension.  I also advised her to start taking over-the-counter magnesium.    Diagnoses and all orders for this visit:    1. PVC (premature ventricular contraction) (Primary)    2. Palpitations    Other orders  -     dilTIAZem CD (Cardizem CD) 120 MG 24 hr capsule; Take 1 capsule by mouth Daily.  Dispense: 30 capsule; Refill: 1           Return in about 4 months (around 9/3/2023).  Patient was given instructions and counseling regarding her condition or for health maintenance advice. Please see specific information pulled into the AVS if appropriate.

## 2023-08-22 ENCOUNTER — TELEPHONE (OUTPATIENT)
Dept: CARDIOLOGY | Facility: CLINIC | Age: 43
End: 2023-08-22
Payer: COMMERCIAL

## 2023-08-24 RX ORDER — DILTIAZEM HYDROCHLORIDE 120 MG/1
120 CAPSULE, COATED, EXTENDED RELEASE ORAL DAILY
Qty: 90 CAPSULE | Refills: 3 | Status: SHIPPED | OUTPATIENT
Start: 2023-08-24

## 2023-08-24 NOTE — TELEPHONE ENCOUNTER
Rx Refill Note  Requested Prescriptions     Pending Prescriptions Disp Refills    dilTIAZem CD (CARDIZEM CD) 120 MG 24 hr capsule 90 capsule 3     Sig: Take 1 capsule by mouth Daily.      Last office visit with prescribing clinician: 5/3/2023   Last telemedicine visit with prescribing clinician: Visit date not found   Next office visit with prescribing clinician: 9/5/2023                             Aline Timmons MA  08/24/23, 16:34 EDT

## 2023-08-25 RX ORDER — DILTIAZEM HYDROCHLORIDE 60 MG/1
60 TABLET, FILM COATED ORAL AS NEEDED
Qty: 30 TABLET | Refills: 3 | Status: SHIPPED | OUTPATIENT
Start: 2023-08-25 | End: 2023-08-25 | Stop reason: SDUPTHER

## 2023-08-25 RX ORDER — DILTIAZEM HYDROCHLORIDE 60 MG/1
60 TABLET, FILM COATED ORAL AS NEEDED
COMMUNITY
Start: 2023-07-18 | End: 2023-08-25 | Stop reason: SDUPTHER

## 2023-08-25 RX ORDER — DILTIAZEM HYDROCHLORIDE 60 MG/1
60 TABLET, FILM COATED ORAL AS NEEDED
Qty: 30 TABLET | Refills: 3 | Status: SHIPPED | OUTPATIENT
Start: 2023-08-25

## 2023-08-25 NOTE — TELEPHONE ENCOUNTER
Rx Refill Note  Requested Prescriptions      No prescriptions requested or ordered in this encounter      Last office visit with prescribing clinician: 5/3/2023   Last telemedicine visit with prescribing clinician: Visit date not found   Next office visit with prescribing clinician: 9/5/2023                         Aline Timmons MA  08/25/23, 13:18 EDT

## 2024-05-14 ENCOUNTER — TELEPHONE (OUTPATIENT)
Dept: CARDIOLOGY | Facility: CLINIC | Age: 44
End: 2024-05-14

## 2024-05-14 NOTE — TELEPHONE ENCOUNTER
I spoke with the patient to get clarification. She is needing a referral put in for the new cardiologist but was commenting on the issues she was having or is needing addressed by the new .

## 2024-05-14 NOTE — TELEPHONE ENCOUNTER
Caller: Bell Rodriguez    Relationship: Self    Best call back number: [phone]    Who is your current provider: CHARLENE    Is your current provider offboarding? NO    Who would you like your new provider to be: TOY    What are your reasons for transferring care: PATIENT NEEDS A DR CLOSER TO HER WORK     Additional notes: SHE NEEDS APPT TO HAVE PAC AND PVC LOOKED AT. PLEASE ADVISE.  SHE THINKS SHE DOESN'T HAVE ENOUGH OXYGEN AND HEART RATE IS SKIPPING

## 2024-05-23 ENCOUNTER — TELEPHONE (OUTPATIENT)
Dept: CARDIOLOGY | Facility: CLINIC | Age: 44
End: 2024-05-23
Payer: COMMERCIAL

## 2024-05-23 NOTE — TELEPHONE ENCOUNTER
I spoke with pt to schedule follow up with Dr Galloway at the [place] office for 5/29/24 at 9am.

## 2024-05-23 NOTE — TELEPHONE ENCOUNTER
I have been told that you just need to call their office of Dr Dang to set up a New Pt appt. You shouldn't need a referral from us since it is an internal referral at another [facility] cardiology office.     Last read by Bell Rodriguez at  2:22 PM on 5/22/2024.  Bell Rodriguez   to Me   LP      5/15/24  9:37 AM  Okay thank you jeffry   May 22, 2024  Bell Rodriguez   to Me   LP      5/22/24  1:40 PM  I’m having trouble trying to get an appointment scheduled they are still talking about a referral   Me   to Ari Galloway MD   CL    5/22/24  2:16 PM  Note      Can we put in a referral to the cardiologist she is wanting to see?         Me   to Bell Rodriguez   CL      5/22/24  2:17 PM  I have sent a note to Dr Galloway to see if we can put in the referral to Dr Acosta    Last read by Bell Rodriguez at  3:19 PM on 5/22/2024.  Bell Rodriguez   to Me   LP      5/22/24  2:19 PM  Okay thank you yeah I called them and they was talking about it still I told them we spoke about it   Bell Rodriguez   to Me   LP      5/22/24  2:24 PM  [facility] Aimee don’t worry about it. They don’t have an opening till September I don’t wanna really wait that long to get in. Not with what my ekg shows at home and one my doctors I work with came from cardio and she was here one day had me put it on she like girl you need to get back in with your doctors she like your all over the place and your heart hates you sitting down. Do you all have any appointments open before September 5/22/24  2:57 PM  Okay, but if you want me to get you scheduled with Dr Galloway to discuss your heart rate fluctuation I can send this message to our schedulers. Would you like me to do that?  He is not booked out that far.    Last read by Bell Rodriguez at  3:19 PM on 5/22/2024.  Bell Rodriguez   to Me   LP      5/22/24  2:58 PM  Yes we can do that I’ll just not worry about moving it closer to work.

## 2024-05-29 ENCOUNTER — OFFICE VISIT (OUTPATIENT)
Dept: CARDIOLOGY | Facility: CLINIC | Age: 44
End: 2024-05-29
Payer: COMMERCIAL

## 2024-05-29 VITALS
WEIGHT: 144 LBS | HEIGHT: 65 IN | DIASTOLIC BLOOD PRESSURE: 76 MMHG | OXYGEN SATURATION: 100 % | SYSTOLIC BLOOD PRESSURE: 111 MMHG | HEART RATE: 65 BPM | BODY MASS INDEX: 23.99 KG/M2

## 2024-05-29 DIAGNOSIS — I49.1 PREMATURE ATRIAL CONTRACTIONS: ICD-10-CM

## 2024-05-29 DIAGNOSIS — R00.2 PALPITATIONS: Primary | ICD-10-CM

## 2024-05-29 PROCEDURE — 99213 OFFICE O/P EST LOW 20 MIN: CPT | Performed by: INTERNAL MEDICINE

## 2024-05-29 RX ORDER — NADOLOL 40 MG/1
40 TABLET ORAL DAILY
Qty: 30 TABLET | Refills: 1 | Status: SHIPPED | OUTPATIENT
Start: 2024-05-29

## 2024-05-29 NOTE — PROGRESS NOTES
Progress note      Name: Bell Rodriguez ADMIT: (Not on file)   : 1980  PCP: Provider, No Known    MRN: [id number] LOS: 0 days   AGE/SEX: 44 y.o. female  ROOM: Room/bed info not found     Chief Complaint   Patient presents with    Follow-up     F/u discuss HR fluctuation       Subjective       History of present illness  Bell Rodriguez is a 44-year-old female patient she is here today for follow-up on palpitations.  Patient has mainly PACs but some PVCs she is very symptomatic from them.  She mostly feels them when she is sitting down and they go away when her heart rate is faster.  We had her on Cardizem 120 once a day along with 60 mg short acting as needed.  They helped but not much.    Past Medical History:   Diagnosis Date    Abnormal ECG     Arrhythmia     Pvc amd pac    Diabetes mellitus     Palpitations      Past Surgical History:   Procedure Laterality Date    CARDIAC CATHETERIZATION       SECTION      GASTRIC SLEEVE LAPAROSCOPIC      HYSTERECTOMY       Family History   Problem Relation Age of Onset    Heart disease Maternal Grandmother      Social History     Tobacco Use    Smoking status: Former     Current packs/day: 0.00     Types: Cigarettes     Quit date: 2012     Years since quittin.4     Passive exposure: Never    Smokeless tobacco: Never   Vaping Use    Vaping status: Some Days    Substances: Nicotine    Passive vaping exposure: Yes   Substance Use Topics    Alcohol use: Yes     Comment: Might here and there maybe few times a year    Drug use: Yes     Types: Marijuana     Comment: I smoke to eat to gain weight i had a gastric sleeve done .       Current Outpatient Medications:     aspirin 81 MG chewable tablet, Chew 1 tablet As Needed for Mild Pain., Disp: , Rfl:     dilTIAZem (CARDIZEM) 60 MG tablet, Take 1 tablet by mouth As Needed (FOR PALPITATIONS)., Disp: 30 tablet, Rfl: 3    nadolol (Corgard) 40 MG tablet, Take 1 tablet by mouth Daily., Disp: 30 tablet, Rfl:  1  Allergies:  Ketoprofen and L-methylfolate-b6-b12      Physical Exam  VITALS REVIEWED    General:      well developed, in no acute distress.    Head:      normocephalic and atraumatic.    Eyes:      PERRL/EOM intact, conjunctiva and sclera clear with out nystagmus.    Neck:      no masses, thyromegaly,  trachea central with normal respiratory effort and PMI displaced laterally  Lungs:      Clear to auscultation bilaterally  Heart:       Irregular pulse  Msk:      no deformity or scoliosis noted of thoracic or lumbar spine.    Pulses:      pulses normal in all 4 extremities.    Extremities:       No lower extremity edema  Neurologic:      no focal deficits.   alert oriented x3  Skin:      intact without lesions or rashes.    Psych:      alert and cooperative; normal mood and affect; normal attention span and concentration.      Result Review :               Pertinent cardiac workup    Event monitor for 27 days on 9/2/2022 showed 1% PVCs and PACs, no arrhythmias.  Stress test 9/2/2022 low risk EF 70%  Echo 9/2/2022 ejection fraction 55 to 60%  14-day Holter monitor starting on 4/4/2023 sinus rhythm throughout, PVC burden less than 0.01%.      Procedures        Assessment and Plan      Bell Rodriguez is a 44-year-old female patient who is here today for follow-up on palpitations.  Patient mainly has PACs and she seems to be very symptomatic from them.  She is on long-acting Cardizem 120 once a day and short acting 60 mg as needed.  She is still symptomatic, I reviewed her EKGs on her phone mostly consistent with PACs.  We will try to switch to long-acting Cardizem with nadolol, hopefully this will suppress PACs.  If not then we can try PAC ablation.  Will serial follow-up in 3 months.    Diagnoses and all orders for this visit:    1. Palpitations (Primary)    2. Premature atrial contractions    Other orders  -     nadolol (Corgard) 40 MG tablet; Take 1 tablet by mouth Daily.  Dispense: 30 tablet; Refill: 1            Return in about 3 months (around 8/29/2024).  Patient was given instructions and counseling regarding her condition or for health maintenance advice. Please see specific information pulled into the AVS if appropriate.       Electronically signed by Ari Galloway MD, 05/29/24, 9:52 AM EDT.

## 2024-06-07 ENCOUNTER — PREP FOR SURGERY (OUTPATIENT)
Dept: OTHER | Facility: HOSPITAL | Age: 44
End: 2024-06-07
Payer: COMMERCIAL

## 2024-06-07 ENCOUNTER — TELEPHONE (OUTPATIENT)
Dept: CARDIOLOGY | Facility: CLINIC | Age: 44
End: 2024-06-07
Payer: COMMERCIAL

## 2024-06-07 DIAGNOSIS — I47.10 SUSTAINED SVT: Primary | ICD-10-CM

## 2024-06-07 NOTE — TELEPHONE ENCOUNTER
----- Message from [facility] Data Minder sent at 6/7/2024 11:41 AM EDT -----  Regarding: Nadolol meds   Contact: [phone]  Francisca BARBOSA. I did try the nadolol a few times  I did split it just to be in the safe side to start out. But that made things worse  my pulse I couldn’t get it to even 40 it stayed in the high 30s and 40s. I took it as soon as I got to work so I was up moving around all day. I also was really cold the few days I took it.  And it seems somewhat like it made it worse as the day went on it definitely made me feel really weak and tired.

## 2024-06-10 ENCOUNTER — TELEPHONE (OUTPATIENT)
Dept: CARDIOLOGY | Facility: CLINIC | Age: 44
End: 2024-06-10
Payer: COMMERCIAL

## 2024-06-10 PROBLEM — I47.10 SUSTAINED SVT: Status: ACTIVE | Noted: 2024-06-07

## 2024-06-10 NOTE — TELEPHONE ENCOUNTER
Caller: Bell Rodriguez    Relationship: Self    Best call back number: [phone]    What is the best time to reach you: ANY    Who are you requesting to speak with (clinical staff, provider,  specific staff member): CLINICAL    Do you know the name of the person who called: HASMUKH    What was the call regarding: PATIENT STATED THAT SHE RECEIVED A CALL FROM HASMUKH ABOUT A ABLATION PROCEDURE.     Is it okay if the provider responds through MyChart: CALL

## 2024-06-12 ENCOUNTER — TELEPHONE (OUTPATIENT)
Dept: CARDIOLOGY | Facility: CLINIC | Age: 44
End: 2024-06-12
Payer: COMMERCIAL

## 2024-06-12 ENCOUNTER — PREP FOR SURGERY (OUTPATIENT)
Dept: OTHER | Facility: HOSPITAL | Age: 44
End: 2024-06-12
Payer: COMMERCIAL

## 2024-06-12 DIAGNOSIS — I49.5 SICK SINUS SYNDROME: Primary | ICD-10-CM

## 2024-06-12 NOTE — TELEPHONE ENCOUNTER
I talked with patient again.  Initial intent was PAC ablation, however instead we will proceed with dual-chamber pacemaker implantation.  Please refer to my encounter note where I detailed my thoughts on what she needs a pacemaker.

## 2024-06-14 ENCOUNTER — TELEPHONE (OUTPATIENT)
Dept: CARDIOLOGY | Facility: CLINIC | Age: 44
End: 2024-06-14
Payer: COMMERCIAL

## 2024-06-14 NOTE — TELEPHONE ENCOUNTER
CALLED PT TO GO OVER PROCEDURE INSTRUCTIONS, PT SCHEDULED 07-05-24 FOR NEW PM.    WILL NEED ALL INSTRUCTIONS FOR THE PROCEDURE

## 2024-06-26 DIAGNOSIS — Z76.89 ENCOUNTER TO ESTABLISH CARE WITH NEW DOCTOR: Primary | ICD-10-CM

## 2024-07-02 ENCOUNTER — LAB (OUTPATIENT)
Dept: LAB | Facility: HOSPITAL | Age: 44
End: 2024-07-02
Payer: COMMERCIAL

## 2024-07-02 DIAGNOSIS — I49.5 SICK SINUS SYNDROME: ICD-10-CM

## 2024-07-02 LAB
BASOPHILS # BLD AUTO: 0.08 10*3/MM3 (ref 0–0.2)
BASOPHILS NFR BLD AUTO: 1.1 % (ref 0–1.5)
DEPRECATED RDW RBC AUTO: 42 FL (ref 37–54)
EOSINOPHIL # BLD AUTO: 0.13 10*3/MM3 (ref 0–0.4)
EOSINOPHIL NFR BLD AUTO: 1.7 % (ref 0.3–6.2)
ERYTHROCYTE [DISTWIDTH] IN BLOOD BY AUTOMATED COUNT: 12.5 % (ref 12.3–15.4)
HCT VFR BLD AUTO: 40.6 % (ref 34–46.6)
HGB BLD-MCNC: 13.2 G/DL (ref 12–15.9)
IMM GRANULOCYTES # BLD AUTO: 0.02 10*3/MM3 (ref 0–0.05)
IMM GRANULOCYTES NFR BLD AUTO: 0.3 % (ref 0–0.5)
INR PPP: 1 (ref 0.93–1.1)
LYMPHOCYTES # BLD AUTO: 2.52 10*3/MM3 (ref 0.7–3.1)
LYMPHOCYTES NFR BLD AUTO: 33.3 % (ref 19.6–45.3)
MCH RBC QN AUTO: 30 PG (ref 26.6–33)
MCHC RBC AUTO-ENTMCNC: 32.5 G/DL (ref 31.5–35.7)
MCV RBC AUTO: 92.3 FL (ref 79–97)
MONOCYTES # BLD AUTO: 0.45 10*3/MM3 (ref 0.1–0.9)
MONOCYTES NFR BLD AUTO: 6 % (ref 5–12)
NEUTROPHILS NFR BLD AUTO: 4.36 10*3/MM3 (ref 1.7–7)
NEUTROPHILS NFR BLD AUTO: 57.6 % (ref 42.7–76)
NRBC BLD AUTO-RTO: 0 /100 WBC (ref 0–0.2)
PLATELET # BLD AUTO: 196 10*3/MM3 (ref 140–450)
PMV BLD AUTO: 12.6 FL (ref 6–12)
PROTHROMBIN TIME: 10.9 SECONDS (ref 9.6–11.7)
RBC # BLD AUTO: 4.4 10*6/MM3 (ref 3.77–5.28)
WBC NRBC COR # BLD AUTO: 7.56 10*3/MM3 (ref 3.4–10.8)

## 2024-07-02 PROCEDURE — 36415 COLL VENOUS BLD VENIPUNCTURE: CPT

## 2024-07-02 PROCEDURE — 85610 PROTHROMBIN TIME: CPT

## 2024-07-02 PROCEDURE — 80053 COMPREHEN METABOLIC PANEL: CPT

## 2024-07-02 PROCEDURE — 85025 COMPLETE CBC W/AUTO DIFF WBC: CPT

## 2024-07-02 PROCEDURE — 83735 ASSAY OF MAGNESIUM: CPT

## 2024-07-03 LAB
ALBUMIN SERPL-MCNC: 4.6 G/DL (ref 3.5–5.2)
ALBUMIN/GLOB SERPL: 1.8 G/DL
ALP SERPL-CCNC: 82 U/L (ref 39–117)
ALT SERPL W P-5'-P-CCNC: 19 U/L (ref 1–33)
ANION GAP SERPL CALCULATED.3IONS-SCNC: 9 MMOL/L (ref 5–15)
AST SERPL-CCNC: 24 U/L (ref 1–32)
BILIRUB SERPL-MCNC: 0.6 MG/DL (ref 0–1.2)
BUN SERPL-MCNC: 14 MG/DL (ref 6–20)
BUN/CREAT SERPL: 21.5 (ref 7–25)
CALCIUM SPEC-SCNC: 10.3 MG/DL (ref 8.6–10.5)
CHLORIDE SERPL-SCNC: 101 MMOL/L (ref 98–107)
CO2 SERPL-SCNC: 31 MMOL/L (ref 22–29)
CREAT SERPL-MCNC: 0.65 MG/DL (ref 0.57–1)
EGFRCR SERPLBLD CKD-EPI 2021: 111.5 ML/MIN/1.73
GLOBULIN UR ELPH-MCNC: 2.6 GM/DL
GLUCOSE SERPL-MCNC: 104 MG/DL (ref 65–99)
MAGNESIUM SERPL-MCNC: 2.3 MG/DL (ref 1.6–2.6)
POTASSIUM SERPL-SCNC: 4.1 MMOL/L (ref 3.5–5.2)
PROT SERPL-MCNC: 7.2 G/DL (ref 6–8.5)
SODIUM SERPL-SCNC: 141 MMOL/L (ref 136–145)

## 2024-07-05 ENCOUNTER — APPOINTMENT (OUTPATIENT)
Dept: GENERAL RADIOLOGY | Facility: HOSPITAL | Age: 44
End: 2024-07-05
Payer: COMMERCIAL

## 2024-07-05 ENCOUNTER — HOSPITAL ENCOUNTER (OUTPATIENT)
Facility: HOSPITAL | Age: 44
Discharge: HOME | End: 2024-07-06
Attending: INTERNAL MEDICINE | Admitting: INTERNAL MEDICINE
Payer: COMMERCIAL

## 2024-07-05 DIAGNOSIS — I49.5 SICK SINUS SYNDROME: ICD-10-CM

## 2024-07-05 DIAGNOSIS — I47.10 SUSTAINED SVT: ICD-10-CM

## 2024-07-05 PROCEDURE — C1785 PMKR, DUAL, RATE-RESP: HCPCS | Performed by: INTERNAL MEDICINE

## 2024-07-05 PROCEDURE — 25810000003 SODIUM CHLORIDE 0.9 % SOLUTION: Performed by: INTERNAL MEDICINE

## 2024-07-05 PROCEDURE — 71045 X-RAY EXAM CHEST 1 VIEW: CPT

## 2024-07-05 PROCEDURE — C1894 INTRO/SHEATH, NON-LASER: HCPCS | Performed by: INTERNAL MEDICINE

## 2024-07-05 PROCEDURE — C1898 LEAD, PMKR, OTHER THAN TRANS: HCPCS | Performed by: INTERNAL MEDICINE

## 2024-07-05 PROCEDURE — 25010000002 FENTANYL CITRATE (PF) 50 MCG/ML SOLUTION: Performed by: INTERNAL MEDICINE

## 2024-07-05 PROCEDURE — 25010000002 CEFAZOLIN PER 500 MG: Performed by: INTERNAL MEDICINE

## 2024-07-05 PROCEDURE — 25010000002 LIDOCAINE 1 % SOLUTION: Performed by: INTERNAL MEDICINE

## 2024-07-05 PROCEDURE — 33208 INSRT HEART PM ATRIAL & VENT: CPT | Performed by: INTERNAL MEDICINE

## 2024-07-05 PROCEDURE — 25010000002 HYDROMORPHONE PER 4 MG: Performed by: INTERNAL MEDICINE

## 2024-07-05 PROCEDURE — 25010000002 MIDAZOLAM PER 1 MG: Performed by: INTERNAL MEDICINE

## 2024-07-05 PROCEDURE — 25510000001 IOPAMIDOL PER 1 ML: Performed by: INTERNAL MEDICINE

## 2024-07-05 DEVICE — PACE/SENSE LEAD
Type: IMPLANTABLE DEVICE | Status: FUNCTIONAL
Brand: INGEVITY™+

## 2024-07-05 DEVICE — PACEMAKER
Type: IMPLANTABLE DEVICE | Status: FUNCTIONAL
Brand: ACCOLADE™ MRI EL DR

## 2024-07-05 RX ORDER — SODIUM CHLORIDE 0.9 % (FLUSH) 0.9 %
3-10 SYRINGE (ML) INJECTION AS NEEDED
Status: DISCONTINUED | OUTPATIENT
Start: 2024-07-05 | End: 2024-07-06 | Stop reason: HOSPADM

## 2024-07-05 RX ORDER — SODIUM CHLORIDE 9 MG/ML
40 INJECTION, SOLUTION INTRAVENOUS AS NEEDED
Status: DISCONTINUED | OUTPATIENT
Start: 2024-07-05 | End: 2024-07-06 | Stop reason: HOSPADM

## 2024-07-05 RX ORDER — HYDROMORPHONE HYDROCHLORIDE 2 MG/ML
INJECTION, SOLUTION INTRAMUSCULAR; INTRAVENOUS; SUBCUTANEOUS
Status: DISCONTINUED | OUTPATIENT
Start: 2024-07-05 | End: 2024-07-05 | Stop reason: HOSPADM

## 2024-07-05 RX ORDER — MIDAZOLAM HYDROCHLORIDE 1 MG/ML
INJECTION INTRAMUSCULAR; INTRAVENOUS
Status: DISCONTINUED | OUTPATIENT
Start: 2024-07-05 | End: 2024-07-05 | Stop reason: HOSPADM

## 2024-07-05 RX ORDER — SODIUM CHLORIDE 0.9 % (FLUSH) 0.9 %
3 SYRINGE (ML) INJECTION EVERY 12 HOURS SCHEDULED
Status: DISCONTINUED | OUTPATIENT
Start: 2024-07-05 | End: 2024-07-06 | Stop reason: HOSPADM

## 2024-07-05 RX ORDER — ASPIRIN 81 MG/1
81 TABLET, CHEWABLE ORAL AS NEEDED
Status: DISCONTINUED | OUTPATIENT
Start: 2024-07-05 | End: 2024-07-06 | Stop reason: HOSPADM

## 2024-07-05 RX ORDER — NADOLOL 20 MG/1
120 TABLET ORAL
Status: DISCONTINUED | OUTPATIENT
Start: 2024-07-05 | End: 2024-07-06 | Stop reason: HOSPADM

## 2024-07-05 RX ORDER — LIDOCAINE HYDROCHLORIDE 10 MG/ML
INJECTION, SOLUTION INFILTRATION; PERINEURAL
Status: DISCONTINUED | OUTPATIENT
Start: 2024-07-05 | End: 2024-07-05 | Stop reason: HOSPADM

## 2024-07-05 RX ORDER — SODIUM CHLORIDE 9 MG/ML
INJECTION, SOLUTION INTRAVENOUS
Status: COMPLETED | OUTPATIENT
Start: 2024-07-05 | End: 2024-07-05

## 2024-07-05 RX ORDER — FENTANYL CITRATE 50 UG/ML
INJECTION, SOLUTION INTRAMUSCULAR; INTRAVENOUS
Status: DISCONTINUED | OUTPATIENT
Start: 2024-07-05 | End: 2024-07-05 | Stop reason: HOSPADM

## 2024-07-05 RX ORDER — SODIUM CHLORIDE 9 MG/ML
30 INJECTION, SOLUTION INTRAVENOUS CONTINUOUS
Status: DISCONTINUED | OUTPATIENT
Start: 2024-07-05 | End: 2024-07-06 | Stop reason: HOSPADM

## 2024-07-05 RX ADMIN — SODIUM CHLORIDE 30 ML/HR: 9 INJECTION, SOLUTION INTRAVENOUS at 09:43

## 2024-07-05 RX ADMIN — SODIUM CHLORIDE 2000 MG: 900 INJECTION INTRAVENOUS at 12:54

## 2024-07-05 RX ADMIN — SODIUM CHLORIDE 2000 MG: 900 INJECTION INTRAVENOUS at 20:09

## 2024-07-05 RX ADMIN — Medication 3 ML: at 20:09

## 2024-07-05 NOTE — H&P
Patient Care Team:  Provider, No Known as PCP - General    Chief complaint here for pacemaker.    Subjective   Patient with sick sinus syndrome here for permanent pacemaker.    History of Present Illness    Ms. Bell Rodriguez has past medical history of    Palpitations  PACs, severely symptomatic  Symptomatic bradycardia    Here for permanent pacemaker implantation.  Patient was seen by electrophysiologist Dr. Silvia Galloway for palpitations and PACs and was intolerant to Cardizem and Nadolol with severe symptomatic bradycardia and was severely symptomatic with PACs, was scheduled for permanent pacemaker by him.  He was tied up in prolonged ablation procedure therefore asked me to do the pacemaker.      Assessment :    Sick sinus syndrome  Symptomatic bradycardia  Symptomatic PACs          Recommendations and plan :    Patient was seen by EP Dr. Silvia Galloway was scheduled for permanent pacemaker.  And he was tied up in the procedure therefore he asked me to do the procedure.  I discussed risk benefits alternatives of the procedure with patient and her  and did shared decision making.  EP will follow-up          Review of records by Dr. Silvia Galloway from 5/29/2024     Since the encounter, patient was started on nadolol, however she had significant side effects mainly due to low heart rate and had to stop it.  And as mentioned previously Cardizem was not helpful either.  My initial intention was to perform PAC ablation, however after doing more thorough chart examination, I do not think PAC ablation would be helpful specially that her extra beats seem to be originating from the junctional area and it would be too risky.     I spoke with the patient and she says that every time her heart rate goes above 70, she feels much better, her symptoms go away and she does not feel the palpitations.  She says that usually her heart rate stays in the 50s and that makes her feel tired and short of breath.     All  things considered, I think that the patient will benefit from dual-chamber pacemaker implantation with intent for atrial pacing for symptom relief for sick sinus syndrome.  Risks indications and benefits were discussed at length with her and she is agreeable.           Review of Systems     See HPI      Past Medical History:   Diagnosis Date    Abnormal ECG     Arrhythmia     Pvc amd pac    Dercum's disease     Diabetes mellitus     Palpitations     Sustained SVT 2024     Past Surgical History:   Procedure Laterality Date    CARDIAC CATHETERIZATION       SECTION      GALLBLADDER SURGERY      GASTRIC SLEEVE LAPAROSCOPIC      HYSTERECTOMY       Family History   Problem Relation Age of Onset    Heart disease Maternal Grandmother      Social History     Tobacco Use    Smoking status: Former     Current packs/day: 0.00     Types: Cigarettes     Quit date: 2012     Years since quittin.5     Passive exposure: Never    Smokeless tobacco: Never   Vaping Use    Vaping status: Some Days    Substances: Nicotine    Passive vaping exposure: Yes   Substance Use Topics    Alcohol use: Yes     Comment: Might here and there maybe few times a year    Drug use: Not Currently     Types: Marijuana     Comment: I smoke to eat to gain weight i had a gastric sleeve done, CBD oil     E-cigarette/Vaping    E-cigarette/Vaping Use Current Some Day User     Passive Exposure Yes     Counseling Given No      E-cigarette/Vaping Substances    Nicotine Yes     THC No     CBD No     Flavoring No      Medications Prior to Admission   Medication Sig Dispense Refill Last Dose    dilTIAZem (CARDIZEM) 60 MG tablet Take 1 tablet by mouth As Needed (FOR PALPITATIONS). 30 tablet 3 Past Week    aspirin 81 MG chewable tablet Chew 1 tablet As Needed for Mild Pain.   More than a month     Allergies:  Ketoprofen and L-methylfolate-b6-b12    Objective      Vital Signs  Temp:  [98 °F (36.7 °C)] 98 °F (36.7 °C)  Heart Rate:  [52-54]  "52  Resp:  [18-19] 18  BP: ()/(60-95) 132/95    Physical Exam    Physical Exam   /95   Pulse 52   Temp 98 °F (36.7 °C) (Oral)   Resp 18   Ht 165.1 cm (65\")   Wt 65.2 kg (143 lb 11.8 oz)   SpO2 100%   BMI 23.92 kg/m²   General:  Appears in no acute distress  Eyes: Sclera is anicteric,  conjunctiva is clear   HEENT:  No JVD. Thyroid not visibly enlarged. No mucosal pallor or cyanosis  Respiratory: Respirations regular and unlabored at rest.  Clear to auscultation  Cardiovascular: S1,S2 Regular rate and rhythm. No murmur, rub or gallop auscultated.  . No pretibial pitting edema  Gastrointestinal: Abdomen nondistended.  Musculoskeletal:  No abnormal movements  Extremities: No digital clubbing or cyanosis  Skin: Color pink.   Neuro: Alert and awake.     Results Review:    I reviewed the patient's new clinical results.      Assessment & Plan       Sustained SVT    Sick sinus syndrome      Assessment & Plan    I discussed the patients findings and my recommendations with patient    Robert Stephens MD  07/05/24  13:12 EDT        "

## 2024-07-05 NOTE — Clinical Note
A 6 fr sheath was successfully inserted using micropuncture technique into the left subclavian vein. Sheath insertion not delayed.

## 2024-07-06 VITALS
SYSTOLIC BLOOD PRESSURE: 109 MMHG | RESPIRATION RATE: 16 BRPM | HEART RATE: 70 BPM | BODY MASS INDEX: 24.43 KG/M2 | OXYGEN SATURATION: 97 % | DIASTOLIC BLOOD PRESSURE: 73 MMHG | WEIGHT: 146.61 LBS | HEIGHT: 65 IN | TEMPERATURE: 98.4 F

## 2024-07-06 PROCEDURE — 99024 POSTOP FOLLOW-UP VISIT: CPT | Performed by: INTERNAL MEDICINE

## 2024-07-06 PROCEDURE — 25010000002 CEFAZOLIN PER 500 MG: Performed by: INTERNAL MEDICINE

## 2024-07-06 RX ADMIN — Medication 3 ML: at 08:32

## 2024-07-06 RX ADMIN — SODIUM CHLORIDE 2000 MG: 900 INJECTION INTRAVENOUS at 05:06

## 2024-07-06 NOTE — DISCHARGE SUMMARY
Cardiology Discharge Summary  PATIENT IDENTIFICATION    Name: Bell Rodriguez  Age: 44 y.o. Sex: female : 1980  MRN: [id number]    Requesting Provider    Robert Stephens*    Patient Care Team:  Provider, No Known as PCP - General    Date of Admission: 2024    Date of Discharge:  2024    Length of stay:  LOS: 0 days     ADMISSION DIAGNOSIS:    Sick sinus syndrome    Sustained SVT    SSS (sick sinus syndrome)        DISCHARGE DIAGNOSIS:  Sick sinus syndrome  PSVT  Tachycardia/bradycardia    Presenting Problem/History of Present Illness    Active Hospital Problems    Diagnosis  POA    **Sick sinus syndrome [I49.5]  Unknown    SSS (sick sinus syndrome) [I49.5]  Yes    Sustained SVT [I47.10]  Unknown      Resolved Hospital Problems   No resolved problems to display.          HOSPITAL COURSE:  Patient is a 44 y.o. female presented with   tachycardia/bradycardia syndrome and sick sinus syndrome.  She has PSVT that is treated prophylactically with negative chronotropic agents however this causes profound bradycardia.  Cardiac electrophysiology evaluated patient and felt the best step forward was to place a pacemaker such that tachyarrhythmias could be controlled with negative chronotropic agents.  She underwent a dual-chamber pacemaker placement on 2024.  She is seen today without incident.  Device demonstrates normal function.    Past Medical History:     Past Medical History:   Diagnosis Date    Abnormal ECG     Arrhythmia     Pvc amd pac    Dercum's disease     Diabetes mellitus     Palpitations     Sustained SVT 2024       Past Surgical History:     Past Surgical History:   Procedure Laterality Date    CARDIAC CATHETERIZATION       SECTION      GALLBLADDER SURGERY      GASTRIC SLEEVE LAPAROSCOPIC      HYSTERECTOMY         Social History:   Social History     Socioeconomic History    Marital status:    Tobacco Use    Smoking status: Former     Current packs/day:  "0.00     Types: Cigarettes     Quit date: 2012     Years since quittin.5     Passive exposure: Never    Smokeless tobacco: Never   Vaping Use    Vaping status: Some Days    Substances: Nicotine    Passive vaping exposure: Yes   Substance and Sexual Activity    Alcohol use: Yes     Comment: Might here and there maybe few times a year    Drug use: Not Currently     Types: Marijuana     Comment: I smoke to eat to gain weight i had a gastric sleeve done, CBD oil    Sexual activity: Yes     Partners: Male     Birth control/protection: Hysterectomy       Procedures Performed       Consults:   Consulting Physician(s)         Provider   Role Specialty     Chris Biswas DO      Consulting Physician Cardiology            Pertinent Test Results:     CBC    Results from last 7 days   Lab Units 24  1728   WBC 10*3/mm3 7.56   HEMOGLOBIN g/dL 13.2   PLATELETS 10*3/mm3 196     Cr Clearance Estimated Creatinine Clearance: 115.9 mL/min (by C-G formula based on SCr of 0.65 mg/dL).  Coag   Results from last 7 days   Lab Units 24  1728   INR  1.00     HbA1C No results found for: \"HGBA1C\"  Blood Glucose No results found for: \"POCGLU\"  Infection     CMP   Results from last 7 days   Lab Units 24  1728   SODIUM mmol/L 141   POTASSIUM mmol/L 4.1   CHLORIDE mmol/L 101   CO2 mmol/L 31.0*   BUN mg/dL 14   CREATININE mg/dL 0.65   GLUCOSE mg/dL 104*   ALBUMIN g/dL 4.6   BILIRUBIN mg/dL 0.6   ALK PHOS U/L 82   AST (SGOT) U/L 24   ALT (SGPT) U/L 19     ABG      UA      LAYNE  No results found for: \"POCMETH\", \"POCAMPHET\", \"POCBARBITUR\", \"POCBENZO\", \"POCCOCAINE\", \"POCOPIATES\", \"POCOXYCODO\", \"POCPHENCYC\", \"POCPROPOXY\", \"POCTHC\", \"POCTRICYC\"  [facility]   Results from last 7 days   Lab Units 24  1728   INR  1.00   HEMOGLOBIN g/dL 13.2   PLATELETS 10*3/mm3 196   CREATININE mg/dL 0.65     Radiology(recent) XR Chest 1 View    Result Date: 2024  Impression: 1. Placement of a left-sided transvenous " "pacemaker. There is no pneumothorax. 2. No active disease. Electronically Signed: Adriano Erickson MD  7/5/2024 3:17 PM EDT  Workstation ID: [id number]              Condition on Discharge: Stable    Vital Signs  Visit Vitals  /73 (BP Location: Right arm, Patient Position: Lying)   Pulse 70   Temp 98.4 °F (36.9 °C) (Oral)   Resp 16   Ht 165.1 cm (65\")   Wt 66.5 kg (146 lb 9.7 oz)   SpO2 97%   BMI 24.40 kg/m²         PHYSICAL EXAM:    General: Alert, cooperative, no distress, appears stated age  Head:  Normocephalic, atraumatic, mucous membranes moist  Eyes:  Conjunctivae/corneas clear, EOM's intact     Neck:  Supple,  no bruit  Lungs:  Clear to auscultation bilaterally, no wheezes rhonchi rales are noted  Chest wall: Tender at pacemaker site.  Bandage noted.  Heart::  Regular rate and rhythm, S1 and S2 normal, 1/6 holosystolic murmur.  No rub or gallop  Abdomen: Soft, non-tender, nondistended bowel sounds active  Extremities: No cyanosis, clubbing, or edema  Pulses: 2+ and symmetric all extremities  Skin:  No rashes or lesions  Neuro/psych: A&O x3. CN II through XII are grossly intact with appropriate affect         Discharge Disposition  Home or Self Care    Discharge Medications     Discharge Medications        Continue These Medications        Instructions Start Date   aspirin 81 MG chewable tablet   81 mg, Oral, As Needed      dilTIAZem 60 MG tablet  Commonly known as: CARDIZEM   60 mg, Oral, As Needed               Discharge Diet:     Activity at Discharge:     Follow-up Appointments  Future Appointments   Date Time Provider Department Center   7/15/2024 11:30 AM [facility]ICE [facility]   8/7/2024 10:30 AM Ari Galloway MD [facility]         Test Results Pending at Discharge       Risk for Readmission (LACE) Score: 1 (7/6/2024  6:00 AM)    Time spent on discharge: 15 minutes      Chris Biswas DO  07/06/24  10:59 EDT  "

## 2024-07-06 NOTE — PLAN OF CARE
Goal Outcome Evaluation:  Plan of Care Reviewed With: patient        Progress: improving  Outcome Evaluation: Patient admitted to [facility] s/p pacemaker placement this shift.  Plan for likely d/c tomorrow.

## 2024-07-07 NOTE — CASE MANAGEMENT/SOCIAL WORK
Case Management Discharge Note      Final Note: d/c home         Selected Continued Care - Discharged on 7/6/2024 Admission date: 7/5/2024 - Discharge disposition: Home or Self Care         Transportation Services  Private: Car    Final Discharge Disposition Code: 01 - home or self-care

## 2024-07-15 ENCOUNTER — CLINICAL SUPPORT NO REQUIREMENTS (OUTPATIENT)
Dept: CARDIOLOGY | Facility: CLINIC | Age: 44
End: 2024-07-15
Payer: COMMERCIAL

## 2024-07-15 DIAGNOSIS — Z95.0 PACEMAKER: ICD-10-CM

## 2024-07-15 DIAGNOSIS — I49.5 SICK SINUS SYNDROME: Primary | ICD-10-CM

## 2024-07-15 DIAGNOSIS — I49.5 TACHY-BRADY SYNDROME: ICD-10-CM

## 2024-07-15 NOTE — PROGRESS NOTES
Patient is here today s/p pacemaker placement. Interrogation of device is normal, leads stable. Patient feels much better, less PAC's. Removed bandage, incision is well approximated,no redness, swelling or drainage noted. Area surrounding the incision where bandage has been is red and has fine blisters. Appears to be from bandage. Advised patient to keep area clean and dry, watch for s/s of infection. Call if any issues. Patient has an appt scheduled 8/7/24 and remote monitor is intact. Patient teaching for incision care, showers, return to work and she may drive.

## 2024-08-07 ENCOUNTER — OFFICE VISIT (OUTPATIENT)
Dept: CARDIOLOGY | Facility: CLINIC | Age: 44
End: 2024-08-07
Payer: COMMERCIAL

## 2024-08-07 VITALS
OXYGEN SATURATION: 97 % | BODY MASS INDEX: 24.16 KG/M2 | HEIGHT: 65 IN | WEIGHT: 145 LBS | DIASTOLIC BLOOD PRESSURE: 68 MMHG | SYSTOLIC BLOOD PRESSURE: 115 MMHG | HEART RATE: 70 BPM

## 2024-08-07 DIAGNOSIS — I49.5 TACHY-BRADY SYNDROME: ICD-10-CM

## 2024-08-07 DIAGNOSIS — I49.5 SICK SINUS SYNDROME: Primary | ICD-10-CM

## 2024-08-07 DIAGNOSIS — Z95.0 PACEMAKER: ICD-10-CM

## 2024-08-07 RX ORDER — DILTIAZEM HYDROCHLORIDE 60 MG/1
60 TABLET, FILM COATED ORAL AS NEEDED
Qty: 60 TABLET | Refills: 3 | Status: SHIPPED | OUTPATIENT
Start: 2024-08-07

## 2024-08-07 NOTE — PROGRESS NOTES
Progress note      Name: Bell Rodriguez ADMIT: (Not on file)   : 1980  PCP: Provider, No Known    MRN: [id number] LOS: 0 days   AGE/SEX: 44 y.o. female  ROOM: Room/bed info not found     No chief complaint on file.      Subjective       History of present illness  Bell Rodriguez is a 44-year-old female patient who had a longstanding history of mainly PACs and symptomatic bradycardia causing dizziness and inability to work.  Due to bradycardia she could not take beta-blockers or calcium channel blockers.  Eventually she received a dual-chamber pacemaker on 2024.  The device was set at 70 to counter the symptoms.  She seems to be doing much better although she still experiencing some palpitations but her symptoms have largely disappeared.    Past Medical History:   Diagnosis Date    Abnormal ECG     Arrhythmia     Pvc amd pac    Dercum's disease     Diabetes mellitus     Heart murmur 2024/ scale    Palpitations     Sustained SVT 2024     Past Surgical History:   Procedure Laterality Date    CARDIAC CATHETERIZATION      CARDIAC ELECTROPHYSIOLOGY PROCEDURE N/A 2024    Procedure: Pacemaker DC new, [employer] - [facility] EMAILED;  Surgeon: Robert Stephens MD;  Location: [facility] INVASIVE LOC[facility];  Service: Cardiovascular;  Laterality: N/A;     SECTION      GALLBLADDER SURGERY      GASTRIC SLEEVE LAPAROSCOPIC      HYSTERECTOMY      INSERT / REPLACE / REMOVE PACEMAKER  2024     Family History   Problem Relation Age of Onset    Heart disease Maternal Grandmother      Social History     Tobacco Use    Smoking status: Former     Current packs/day: 0.00     Types: Cigarettes     Quit date: 2012     Years since quittin.6     Passive exposure: Never    Smokeless tobacco: Never   Vaping Use    Vaping status: Some Days    Substances: Nicotine    Passive vaping exposure: Yes   Substance Use Topics    Alcohol use: Yes     Comment: Might here and there maybe few times a  year    Drug use: Not Currently     Types: Marijuana     Comment: I smoke to eat to gain weight i had a gastric sleeve done .       Current Outpatient Medications:     aspirin 81 MG chewable tablet, Chew 1 tablet As Needed for Mild Pain., Disp: , Rfl:     dilTIAZem (CARDIZEM) 60 MG tablet, Take 1 tablet by mouth As Needed (FOR PALPITATIONS)., Disp: 60 tablet, Rfl: 3  Allergies:  Ketoprofen and L-methylfolate-b6-b12      Physical Exam  VITALS REVIEWED    General:      well developed, in no acute distress.    Head:      normocephalic and atraumatic.    Eyes:      PERRL/EOM intact, conjunctiva and sclera clear with out nystagmus.    Neck:      no masses, thyromegaly,  trachea central with normal respiratory effort and PMI displaced laterally  Lungs:      Clear to auscultation bilaterally  Heart:       Regular rate and rhythm  Msk:      no deformity or scoliosis noted of thoracic or lumbar spine.    Pulses:      pulses normal in all 4 extremities.    Extremities:       No lower extremity edema  Neurologic:      no focal deficits.   alert oriented x3  Skin:      intact without lesions or rashes.    Psych:      alert and cooperative; normal mood and affect; normal attention span and concentration.      Result Review :               Pertinent cardiac workup    Event monitor for 27 days on 9/2/2022 showed 1% PVCs and PACs, no arrhythmias.  Stress test 9/2/2022 low risk EF 70%  Echo 9/2/2022 ejection fraction 55 to 60%  14-day Holter monitor starting on 4/4/2023 sinus rhythm throughout, PVC burden less than 0.01%.         Procedures        Assessment and Plan      Bell Rodriguez is a 44-year-old female patient who has a pacemaker implanted on 7/5/2024 due to frequent PACs, inability to take rate slowing agents due to bradycardia.  She is feeling much better after pacemaker implant, she had requested lower rate to be set at 70 to suppress the PACs.  She is now able to take diltiazem which is also helping her.  Will see her in  6 months for follow-up.    Diagnoses and all orders for this visit:    1. Sick sinus syndrome (Primary)    2. Tachy-angus syndrome    3. Pacemaker    Other orders  -     dilTIAZem (CARDIZEM) 60 MG tablet; Take 1 tablet by mouth As Needed (FOR PALPITATIONS).  Dispense: 60 tablet; Refill: 3           Return in about 6 months (around 2/7/2025).  Patient was given instructions and counseling regarding her condition or for health maintenance advice. Please see specific information pulled into the AVS if appropriate.       Electronically signed by Ari Galloway MD, 08/07/24, 11:01 AM EDT.

## 2025-02-11 ENCOUNTER — CLINICAL SUPPORT NO REQUIREMENTS (OUTPATIENT)
Dept: CARDIOLOGY | Facility: CLINIC | Age: 45
End: 2025-02-11
Payer: COMMERCIAL

## 2025-02-11 ENCOUNTER — OFFICE VISIT (OUTPATIENT)
Dept: CARDIOLOGY | Facility: CLINIC | Age: 45
End: 2025-02-11
Payer: COMMERCIAL

## 2025-02-11 VITALS
SYSTOLIC BLOOD PRESSURE: 120 MMHG | HEART RATE: 73 BPM | HEIGHT: 65 IN | OXYGEN SATURATION: 99 % | WEIGHT: 145.25 LBS | DIASTOLIC BLOOD PRESSURE: 70 MMHG | BODY MASS INDEX: 24.2 KG/M2

## 2025-02-11 DIAGNOSIS — I49.5 SICK SINUS SYNDROME: ICD-10-CM

## 2025-02-11 DIAGNOSIS — I49.5 TACHY-BRADY SYNDROME: ICD-10-CM

## 2025-02-11 DIAGNOSIS — Z95.0 PACEMAKER: ICD-10-CM

## 2025-02-11 DIAGNOSIS — Z95.0 PACEMAKER: Primary | ICD-10-CM

## 2025-02-11 DIAGNOSIS — R00.2 PALPITATIONS: Primary | ICD-10-CM

## 2025-02-11 DIAGNOSIS — I47.10 SUSTAINED SVT: Chronic | ICD-10-CM

## 2025-02-11 PROCEDURE — 99214 OFFICE O/P EST MOD 30 MIN: CPT | Performed by: INTERNAL MEDICINE

## 2025-02-11 NOTE — PROGRESS NOTES
Progress note      Name: Bell Rodriguez ADMIT: (Not on file)   : 1980  PCP: Provider, No Known    MRN: [id number] LOS: 0 days   AGE/SEX: 44 y.o. female  ROOM: Room/bed info not found     Chief Complaint   Patient presents with    Follow-up     DEVICE W/CHEM       Subjective       History of present illness  Bell Rodriguez is a 44-year-old female patient who had a longstanding history of mainly PACs and symptomatic bradycardia causing dizziness and inability to work. Due to bradycardia she could not take beta-blockers or calcium channel blockers. Eventually she received a dual-chamber pacemaker on 2024. The device was set at 70 to counter the symptoms. She seems to be doing much better although she still experiencing some palpitations but her symptoms have largely disappeared.     Past Medical History:   Diagnosis Date    Abnormal ECG     Arrhythmia     Pvc amd pac    Dercum's disease     Diabetes mellitus     Heart murmur 2024/ scale    Palpitations     Sustained SVT 2024     Past Surgical History:   Procedure Laterality Date    CARDIAC CATHETERIZATION      CARDIAC ELECTROPHYSIOLOGY PROCEDURE N/A 2024    Procedure: Pacemaker DC new, [employer] - [employer] EMAILED;  Surgeon: Robert Stephens MD;  Location: [facility] INVASIVE LOCATION;  Service: Cardiovascular;  Laterality: N/A;     SECTION      GALLBLADDER SURGERY      GASTRIC SLEEVE LAPAROSCOPIC      HYSTERECTOMY      INSERT / REPLACE / REMOVE PACEMAKER  2024     Family History   Problem Relation Age of Onset    Heart disease Maternal Grandmother     Arrhythmia Maternal Grandmother     Heart attack Maternal Grandmother     Heart failure Maternal Grandmother     Hypertension Maternal Grandmother      Social History     Tobacco Use    Smoking status: Former     Current packs/day: 0.00     Types: Cigarettes     Quit date: 2012     Years since quittin.1     Passive exposure: Never    Smokeless tobacco: Never    Vaping Use    Vaping status: Some Days    Substances: Nicotine    Passive vaping exposure: Yes   Substance Use Topics    Alcohol use: Yes     Comment: Might here and there maybe few times a year    Drug use: Not Currently     Types: Marijuana     Comment: I smoke to eat to gain weight i had a gastric sleeve done .       Current Outpatient Medications:     aspirin 81 MG chewable tablet, Chew 1 tablet As Needed for Mild Pain., Disp: , Rfl:     dilTIAZem (CARDIZEM) 60 MG tablet, Take 1 tablet by mouth As Needed (FOR PALPITATIONS)., Disp: 60 tablet, Rfl: 3  Allergies:  Ketoprofen and L-methylfolate-b6-b12      Physical Exam  VITALS REVIEWED    General:      well developed, in no acute distress.    Head:      normocephalic and atraumatic.    Eyes:      PERRL/EOM intact, conjunctiva and sclera clear with out nystagmus.    Neck:      no masses, thyromegaly,  trachea central with normal respiratory effort and PMI displaced laterally  Lungs:      Clear to auscultation bilaterally  Heart:       Regular rate and rhythm  Msk:      no deformity or scoliosis noted of thoracic or lumbar spine.    Pulses:      pulses normal in all 4 extremities.    Extremities:       No lower extremity edema  Neurologic:      no focal deficits.   alert oriented x3  Skin:      intact without lesions or rashes.    Psych:      alert and cooperative; normal mood and affect; normal attention span and concentration.      Result Review :               Pertinent cardiac workup    Event monitor for 27 days on 9/2/2022 showed 1% PVCs and PACs, no arrhythmias.  Stress test 9/2/2022 low risk EF 70%  Echo 9/2/2022 ejection fraction 55 to 60%  14-day Holter monitor starting on 4/4/2023 sinus rhythm throughout, PVC burden less than 0.01%.      Procedures        Assessment and Plan      Bell Rodriguez is a 44-year-old female patient who has a pacemaker implanted on 7/5/2024 due to frequent PACs, inability to take rate slowing agents due to bradycardia. She is  feeling much better after pacemaker implant, she had requested lower rate to be set at 70 to suppress the PACs.  She has not had to take diltiazem but few times.  Overall she feels well.  No changes today, 6-month follow-up.    Diagnoses and all orders for this visit:    1. Palpitations (Primary)  -     Comprehensive Metabolic Panel; Future  -     Lipid Panel; Future    2. Sick sinus syndrome    3. Pacemaker    4. Sustained SVT           Return in about 6 months (around 8/11/2025), or DEVICE.  Patient was given instructions and counseling regarding her condition or for health maintenance advice. Please see specific information pulled into the AVS if appropriate.     Electronically signed by Ari Galloway MD, 02/11/25, 10:24 AM EST.

## 2025-07-07 NOTE — TELEPHONE ENCOUNTER
Incoming Refill Request      Medication requested (name and dose): DILTIAZEM 120 MG  DILTIAZEM 60 MG     Pharmacy where request should be sent: [facility] [address]    Additional details provided by patient: PATIENT SAID SHE NEEDS BOTH CALLED IN.  Best call back number: [phone]    Does the patient have less than a 3 day supply:  [] Yes  [x] No    Tommie Grant Rep  08/22/23, 13:04 EDT          
Lmom to call our office back.  
Pt states that Dr. Galloway would like her to take Diltiazem  mg extended release daily and Diltiazem 60 mg as needed.   
That is correct. Diltiazem 120 daily, and 60mg as needed.
Dr Myers

## 2025-08-24 LAB
MC_CV_MDC_IDC_RATE_1: 160
MC_CV_MDC_IDC_ZONE_ID: 1
MDC_IDC_MSMT_BATTERY_REMAINING_LONGEVITY: 150 MO
MDC_IDC_MSMT_BATTERY_REMAINING_PERCENTAGE: 100 %
MDC_IDC_MSMT_BATTERY_STATUS: NORMAL
MDC_IDC_MSMT_LEADCHNL_RA_DTM: NORMAL
MDC_IDC_MSMT_LEADCHNL_RA_IMPEDANCE_VALUE: 557
MDC_IDC_MSMT_LEADCHNL_RA_PACING_THRESHOLD_POLARITY: NORMAL
MDC_IDC_MSMT_LEADCHNL_RV_DTM: NORMAL
MDC_IDC_MSMT_LEADCHNL_RV_IMPEDANCE_VALUE: 599
MDC_IDC_MSMT_LEADCHNL_RV_PACING_THRESHOLD_AMPLITUDE: 1.3
MDC_IDC_MSMT_LEADCHNL_RV_PACING_THRESHOLD_POLARITY: NORMAL
MDC_IDC_MSMT_LEADCHNL_RV_PACING_THRESHOLD_PULSEWIDTH: 0.4
MDC_IDC_MSMT_LEADCHNL_RV_SENSING_INTR_AMPL: 13.8
MDC_IDC_PG_IMPLANT_DTM: NORMAL
MDC_IDC_PG_MFG: NORMAL
MDC_IDC_PG_MODEL: NORMAL
MDC_IDC_PG_SERIAL: NORMAL
MDC_IDC_PG_TYPE: NORMAL
MDC_IDC_SESS_DTM: NORMAL
MDC_IDC_SESS_TYPE: NORMAL
MDC_IDC_SET_BRADY_AT_MODE_SWITCH_RATE: 170
MDC_IDC_SET_BRADY_LOWRATE: 70
MDC_IDC_SET_BRADY_MAX_SENSOR_RATE: 130
MDC_IDC_SET_BRADY_MAX_TRACKING_RATE: 120
MDC_IDC_SET_BRADY_MODE: NORMAL
MDC_IDC_SET_BRADY_PAV_DELAY: 220
MDC_IDC_SET_BRADY_SAV_DELAY: 220
MDC_IDC_SET_LEADCHNL_RA_PACING_AMPLITUDE: 2.5
MDC_IDC_SET_LEADCHNL_RA_PACING_POLARITY: NORMAL
MDC_IDC_SET_LEADCHNL_RA_PACING_PULSEWIDTH: 0.4
MDC_IDC_SET_LEADCHNL_RA_SENSING_POLARITY: NORMAL
MDC_IDC_SET_LEADCHNL_RA_SENSING_SENSITIVITY: 0.5
MDC_IDC_SET_LEADCHNL_RV_PACING_AMPLITUDE: 2.5
MDC_IDC_SET_LEADCHNL_RV_PACING_POLARITY: NORMAL
MDC_IDC_SET_LEADCHNL_RV_PACING_PULSEWIDTH: 0.4
MDC_IDC_SET_LEADCHNL_RV_SENSING_POLARITY: NORMAL
MDC_IDC_SET_LEADCHNL_RV_SENSING_SENSITIVITY: 2.5
MDC_IDC_SET_ZONE_STATUS: NORMAL
MDC_IDC_SET_ZONE_TYPE: NORMAL
MDC_IDC_STAT_AT_BURDEN_PERCENT: 0
MDC_IDC_STAT_BRADY_RA_PERCENT_PACED: 65
MDC_IDC_STAT_BRADY_RV_PERCENT_PACED: 0

## (undated) DEVICE — REFLEX ONE, SKIN STAPLER, 35 WIDE: Brand: REFLEX

## (undated) DEVICE — PENCL SMOKE/EVAC MEGADYNE TELESCP 15FT

## (undated) DEVICE — PACEMAKER CDS: Brand: MEDLINE INDUSTRIES, INC.

## (undated) DEVICE — ELECTRD DEFIB M/FUNC PROPADZ RADIOL 2PK

## (undated) DEVICE — SUT SILK 2/0 SH CR8 18IN CR8 C012D

## (undated) DEVICE — CABL BIPOL W/ALLGTR CLIP/SM 12FT

## (undated) DEVICE — 3M™ IOBAN™ 2 ANTIMICROBIAL INCISE DRAPE 6650EZ: Brand: IOBAN™ 2

## (undated) DEVICE — INTRO SHEATH PRELUDE SNAP .038 6F 13CM W/SDPRT

## (undated) DEVICE — ST ACC MICROPUNCTURE STFF/CANN PLAT/TP 4F 21G 40CM

## (undated) DEVICE — SUT MNCRYL 3/0 SH 27 IN Y416H

## (undated) DEVICE — 3M™ PATIENT PLATE, CORDED, SPLIT, LARGE, 40 PER CASE, 1179: Brand: 3M™

## (undated) DEVICE — IMMOB SHLDR CUT/AWAY UNIV

## (undated) DEVICE — PINNACLE INTRODUCER SHEATH: Brand: PINNACLE

## (undated) DEVICE — DRAPE,INSTRUMENT,MAGNETIC,10X16: Brand: MEDLINE